# Patient Record
Sex: MALE | Race: WHITE | URBAN - METROPOLITAN AREA
[De-identification: names, ages, dates, MRNs, and addresses within clinical notes are randomized per-mention and may not be internally consistent; named-entity substitution may affect disease eponyms.]

---

## 2022-12-28 RX ORDER — AMOXICILLIN 250 MG/5ML
1 SUSPENSION, RECONSTITUTED, ORAL (ML) ORAL
Qty: 0 | Refills: 0 | DISCHARGE
Start: 2022-12-28 | End: 2023-01-06

## 2022-12-29 ENCOUNTER — INPATIENT (INPATIENT)
Facility: HOSPITAL | Age: 72
LOS: 3 days | Discharge: HOME | DRG: 153 | End: 2023-01-02
Attending: HOSPITALIST | Admitting: HOSPITALIST
Payer: MEDICARE

## 2022-12-29 VITALS
OXYGEN SATURATION: 92 % | TEMPERATURE: 101 F | RESPIRATION RATE: 18 BRPM | DIASTOLIC BLOOD PRESSURE: 64 MMHG | HEART RATE: 81 BPM | SYSTOLIC BLOOD PRESSURE: 106 MMHG

## 2022-12-29 DIAGNOSIS — Z96.611 PRESENCE OF RIGHT ARTIFICIAL SHOULDER JOINT: Chronic | ICD-10-CM

## 2022-12-29 DIAGNOSIS — A41.9 SEPSIS, UNSPECIFIED ORGANISM: ICD-10-CM

## 2022-12-29 LAB
ADD ON TEST-SPECIMEN IN LAB: SIGNIFICANT CHANGE UP
ALBUMIN SERPL ELPH-MCNC: 3 G/DL — LOW (ref 3.3–5)
ALP SERPL-CCNC: 93 U/L — SIGNIFICANT CHANGE UP (ref 40–120)
ALT FLD-CCNC: 26 U/L — SIGNIFICANT CHANGE UP (ref 12–78)
ANION GAP SERPL CALC-SCNC: 7 MMOL/L — SIGNIFICANT CHANGE UP (ref 5–17)
APPEARANCE UR: CLEAR — SIGNIFICANT CHANGE UP
APTT BLD: 34 SEC — SIGNIFICANT CHANGE UP (ref 27.5–35.5)
AST SERPL-CCNC: 19 U/L — SIGNIFICANT CHANGE UP (ref 15–37)
BACTERIA # UR AUTO: ABNORMAL
BASOPHILS # BLD AUTO: 0.01 K/UL — SIGNIFICANT CHANGE UP (ref 0–0.2)
BASOPHILS NFR BLD AUTO: 0.1 % — SIGNIFICANT CHANGE UP (ref 0–2)
BILIRUB SERPL-MCNC: 0.6 MG/DL — SIGNIFICANT CHANGE UP (ref 0.2–1.2)
BILIRUB UR-MCNC: NEGATIVE — SIGNIFICANT CHANGE UP
BUN SERPL-MCNC: 17 MG/DL — SIGNIFICANT CHANGE UP (ref 7–23)
CALCIUM SERPL-MCNC: 8.9 MG/DL — SIGNIFICANT CHANGE UP (ref 8.5–10.1)
CHLORIDE SERPL-SCNC: 108 MMOL/L — SIGNIFICANT CHANGE UP (ref 96–108)
CO2 SERPL-SCNC: 27 MMOL/L — SIGNIFICANT CHANGE UP (ref 22–31)
COLOR SPEC: YELLOW — SIGNIFICANT CHANGE UP
COMMENT - URINE: SIGNIFICANT CHANGE UP
CREAT SERPL-MCNC: 1.15 MG/DL — SIGNIFICANT CHANGE UP (ref 0.5–1.3)
DIFF PNL FLD: ABNORMAL
EGFR: 67 ML/MIN/1.73M2 — SIGNIFICANT CHANGE UP
EOSINOPHIL # BLD AUTO: 0.05 K/UL — SIGNIFICANT CHANGE UP (ref 0–0.5)
EOSINOPHIL NFR BLD AUTO: 0.5 % — SIGNIFICANT CHANGE UP (ref 0–6)
EPI CELLS # UR: SIGNIFICANT CHANGE UP
FLUAV AG NPH QL: SIGNIFICANT CHANGE UP
FLUBV AG NPH QL: SIGNIFICANT CHANGE UP
GLUCOSE BLDC GLUCOMTR-MCNC: 100 MG/DL — HIGH (ref 70–99)
GLUCOSE BLDC GLUCOMTR-MCNC: 107 MG/DL — HIGH (ref 70–99)
GLUCOSE SERPL-MCNC: 209 MG/DL — HIGH (ref 70–99)
GLUCOSE UR QL: NEGATIVE — SIGNIFICANT CHANGE UP
HCT VFR BLD CALC: 41.8 % — SIGNIFICANT CHANGE UP (ref 39–50)
HGB BLD-MCNC: 13.6 G/DL — SIGNIFICANT CHANGE UP (ref 13–17)
HYALINE CASTS # UR AUTO: ABNORMAL /LPF
IMM GRANULOCYTES NFR BLD AUTO: 0.5 % — SIGNIFICANT CHANGE UP (ref 0–0.9)
INR BLD: 1.97 RATIO — HIGH (ref 0.88–1.16)
KETONES UR-MCNC: ABNORMAL
LACTATE SERPL-SCNC: 1.3 MMOL/L — SIGNIFICANT CHANGE UP (ref 0.7–2)
LACTATE SERPL-SCNC: 2.9 MMOL/L — HIGH (ref 0.7–2)
LEUKOCYTE ESTERASE UR-ACNC: NEGATIVE — SIGNIFICANT CHANGE UP
LYMPHOCYTES # BLD AUTO: 0.33 K/UL — LOW (ref 1–3.3)
LYMPHOCYTES # BLD AUTO: 3.1 % — LOW (ref 13–44)
MCHC RBC-ENTMCNC: 31.9 PG — SIGNIFICANT CHANGE UP (ref 27–34)
MCHC RBC-ENTMCNC: 32.5 GM/DL — SIGNIFICANT CHANGE UP (ref 32–36)
MCV RBC AUTO: 98.1 FL — SIGNIFICANT CHANGE UP (ref 80–100)
MONOCYTES # BLD AUTO: 0.4 K/UL — SIGNIFICANT CHANGE UP (ref 0–0.9)
MONOCYTES NFR BLD AUTO: 3.7 % — SIGNIFICANT CHANGE UP (ref 2–14)
NEUTROPHILS # BLD AUTO: 9.95 K/UL — HIGH (ref 1.8–7.4)
NEUTROPHILS NFR BLD AUTO: 92.1 % — HIGH (ref 43–77)
NITRITE UR-MCNC: NEGATIVE — SIGNIFICANT CHANGE UP
PH UR: 5 — SIGNIFICANT CHANGE UP (ref 5–8)
PLATELET # BLD AUTO: 311 K/UL — SIGNIFICANT CHANGE UP (ref 150–400)
POTASSIUM SERPL-MCNC: 4.1 MMOL/L — SIGNIFICANT CHANGE UP (ref 3.5–5.3)
POTASSIUM SERPL-SCNC: 4.1 MMOL/L — SIGNIFICANT CHANGE UP (ref 3.5–5.3)
PROT SERPL-MCNC: 7.1 GM/DL — SIGNIFICANT CHANGE UP (ref 6–8.3)
PROT UR-MCNC: 30 MG/DL
PROTHROM AB SERPL-ACNC: 23 SEC — HIGH (ref 10.5–13.4)
RAPID RVP RESULT: DETECTED
RBC # BLD: 4.26 M/UL — SIGNIFICANT CHANGE UP (ref 4.2–5.8)
RBC # FLD: 14.8 % — HIGH (ref 10.3–14.5)
RBC CASTS # UR COMP ASSIST: SIGNIFICANT CHANGE UP /HPF (ref 0–4)
RSV RNA NPH QL NAA+NON-PROBE: SIGNIFICANT CHANGE UP
RV+EV RNA SPEC QL NAA+PROBE: DETECTED
SARS-COV-2 RNA SPEC QL NAA+PROBE: SIGNIFICANT CHANGE UP
SARS-COV-2 RNA SPEC QL NAA+PROBE: SIGNIFICANT CHANGE UP
SODIUM SERPL-SCNC: 142 MMOL/L — SIGNIFICANT CHANGE UP (ref 135–145)
SP GR SPEC: 1.02 — SIGNIFICANT CHANGE UP (ref 1.01–1.02)
TROPONIN I, HIGH SENSITIVITY RESULT: 6.61 NG/L — SIGNIFICANT CHANGE UP
UROBILINOGEN FLD QL: NEGATIVE — SIGNIFICANT CHANGE UP
WBC # BLD: 10.79 K/UL — HIGH (ref 3.8–10.5)
WBC # FLD AUTO: 10.79 K/UL — HIGH (ref 3.8–10.5)
WBC UR QL: SIGNIFICANT CHANGE UP /HPF (ref 0–5)

## 2022-12-29 PROCEDURE — 85014 HEMATOCRIT: CPT

## 2022-12-29 PROCEDURE — 86803 HEPATITIS C AB TEST: CPT

## 2022-12-29 PROCEDURE — 99285 EMERGENCY DEPT VISIT HI MDM: CPT

## 2022-12-29 PROCEDURE — 80048 BASIC METABOLIC PNL TOTAL CA: CPT

## 2022-12-29 PROCEDURE — 84100 ASSAY OF PHOSPHORUS: CPT

## 2022-12-29 PROCEDURE — 99222 1ST HOSP IP/OBS MODERATE 55: CPT

## 2022-12-29 PROCEDURE — 82607 VITAMIN B-12: CPT

## 2022-12-29 PROCEDURE — 82746 ASSAY OF FOLIC ACID SERUM: CPT

## 2022-12-29 PROCEDURE — 85018 HEMOGLOBIN: CPT

## 2022-12-29 PROCEDURE — 94640 AIRWAY INHALATION TREATMENT: CPT

## 2022-12-29 PROCEDURE — 71045 X-RAY EXAM CHEST 1 VIEW: CPT | Mod: 26

## 2022-12-29 PROCEDURE — 83735 ASSAY OF MAGNESIUM: CPT

## 2022-12-29 PROCEDURE — 36415 COLL VENOUS BLD VENIPUNCTURE: CPT

## 2022-12-29 PROCEDURE — 85025 COMPLETE CBC W/AUTO DIFF WBC: CPT

## 2022-12-29 PROCEDURE — 82962 GLUCOSE BLOOD TEST: CPT

## 2022-12-29 PROCEDURE — 83550 IRON BINDING TEST: CPT

## 2022-12-29 PROCEDURE — 83605 ASSAY OF LACTIC ACID: CPT

## 2022-12-29 PROCEDURE — 93970 EXTREMITY STUDY: CPT

## 2022-12-29 PROCEDURE — 83540 ASSAY OF IRON: CPT

## 2022-12-29 PROCEDURE — 85610 PROTHROMBIN TIME: CPT

## 2022-12-29 PROCEDURE — 85730 THROMBOPLASTIN TIME PARTIAL: CPT

## 2022-12-29 PROCEDURE — 83036 HEMOGLOBIN GLYCOSYLATED A1C: CPT

## 2022-12-29 PROCEDURE — 93010 ELECTROCARDIOGRAM REPORT: CPT

## 2022-12-29 PROCEDURE — 70486 CT MAXILLOFACIAL W/O DYE: CPT

## 2022-12-29 RX ORDER — PREGABALIN 225 MG/1
1000 CAPSULE ORAL
Qty: 0 | Refills: 0 | DISCHARGE

## 2022-12-29 RX ORDER — LANOLIN ALCOHOL/MO/W.PET/CERES
2 CREAM (GRAM) TOPICAL
Qty: 0 | Refills: 0 | DISCHARGE

## 2022-12-29 RX ORDER — LEVOTHYROXINE SODIUM 125 MCG
25 TABLET ORAL DAILY
Refills: 0 | Status: DISCONTINUED | OUTPATIENT
Start: 2022-12-29 | End: 2023-01-02

## 2022-12-29 RX ORDER — ATORVASTATIN CALCIUM 80 MG/1
1 TABLET, FILM COATED ORAL
Qty: 0 | Refills: 0 | DISCHARGE

## 2022-12-29 RX ORDER — SODIUM CHLORIDE 9 MG/ML
1000 INJECTION, SOLUTION INTRAVENOUS
Refills: 0 | Status: DISCONTINUED | OUTPATIENT
Start: 2022-12-29 | End: 2023-01-01

## 2022-12-29 RX ORDER — OXYCODONE HYDROCHLORIDE 5 MG/1
1 TABLET ORAL
Qty: 0 | Refills: 0 | DISCHARGE

## 2022-12-29 RX ORDER — TEMAZEPAM 15 MG/1
30 CAPSULE ORAL AT BEDTIME
Refills: 0 | Status: DISCONTINUED | OUTPATIENT
Start: 2022-12-29 | End: 2023-01-02

## 2022-12-29 RX ORDER — BENZOCAINE AND MENTHOL 5; 1 G/100ML; G/100ML
1 LIQUID ORAL ONCE
Refills: 0 | Status: DISCONTINUED | OUTPATIENT
Start: 2022-12-29 | End: 2023-01-02

## 2022-12-29 RX ORDER — CEFEPIME 1 G/1
INJECTION, POWDER, FOR SOLUTION INTRAMUSCULAR; INTRAVENOUS
Refills: 0 | Status: DISCONTINUED | OUTPATIENT
Start: 2022-12-29 | End: 2022-12-30

## 2022-12-29 RX ORDER — ATORVASTATIN CALCIUM 80 MG/1
20 TABLET, FILM COATED ORAL AT BEDTIME
Refills: 0 | Status: DISCONTINUED | OUTPATIENT
Start: 2022-12-29 | End: 2023-01-02

## 2022-12-29 RX ORDER — DEXTROSE 50 % IN WATER 50 %
25 SYRINGE (ML) INTRAVENOUS ONCE
Refills: 0 | Status: DISCONTINUED | OUTPATIENT
Start: 2022-12-29 | End: 2023-01-01

## 2022-12-29 RX ORDER — TEMAZEPAM 15 MG/1
2 CAPSULE ORAL
Qty: 0 | Refills: 0 | DISCHARGE

## 2022-12-29 RX ORDER — OXYCODONE HYDROCHLORIDE 5 MG/1
5 TABLET ORAL EVERY 8 HOURS
Refills: 0 | Status: DISCONTINUED | OUTPATIENT
Start: 2022-12-29 | End: 2023-01-02

## 2022-12-29 RX ORDER — ALBUTEROL 90 UG/1
2 AEROSOL, METERED ORAL
Qty: 0 | Refills: 0 | DISCHARGE

## 2022-12-29 RX ORDER — LEVOTHYROXINE SODIUM 125 MCG
1 TABLET ORAL
Qty: 0 | Refills: 0 | DISCHARGE

## 2022-12-29 RX ORDER — WARFARIN SODIUM 2.5 MG/1
3 TABLET ORAL ONCE
Refills: 0 | Status: COMPLETED | OUTPATIENT
Start: 2022-12-29 | End: 2022-12-29

## 2022-12-29 RX ORDER — SODIUM CHLORIDE 9 MG/ML
2000 INJECTION INTRAMUSCULAR; INTRAVENOUS; SUBCUTANEOUS ONCE
Refills: 0 | Status: COMPLETED | OUTPATIENT
Start: 2022-12-29 | End: 2022-12-29

## 2022-12-29 RX ORDER — ALBUTEROL 90 UG/1
2 AEROSOL, METERED ORAL EVERY 6 HOURS
Refills: 0 | Status: DISCONTINUED | OUTPATIENT
Start: 2022-12-29 | End: 2023-01-02

## 2022-12-29 RX ORDER — DEXTROSE 50 % IN WATER 50 %
15 SYRINGE (ML) INTRAVENOUS ONCE
Refills: 0 | Status: DISCONTINUED | OUTPATIENT
Start: 2022-12-29 | End: 2023-01-01

## 2022-12-29 RX ORDER — DULAGLUTIDE 4.5 MG/.5ML
1.5 INJECTION, SOLUTION SUBCUTANEOUS
Qty: 0 | Refills: 0 | DISCHARGE

## 2022-12-29 RX ORDER — CEFEPIME 1 G/1
1000 INJECTION, POWDER, FOR SOLUTION INTRAMUSCULAR; INTRAVENOUS EVERY 8 HOURS
Refills: 0 | Status: DISCONTINUED | OUTPATIENT
Start: 2022-12-30 | End: 2022-12-30

## 2022-12-29 RX ORDER — WARFARIN SODIUM 2.5 MG/1
1 TABLET ORAL
Qty: 0 | Refills: 0 | DISCHARGE

## 2022-12-29 RX ORDER — ESCITALOPRAM OXALATE 10 MG/1
10 TABLET, FILM COATED ORAL DAILY
Refills: 0 | Status: DISCONTINUED | OUTPATIENT
Start: 2022-12-29 | End: 2023-01-02

## 2022-12-29 RX ORDER — GLUCAGON INJECTION, SOLUTION 0.5 MG/.1ML
1 INJECTION, SOLUTION SUBCUTANEOUS ONCE
Refills: 0 | Status: DISCONTINUED | OUTPATIENT
Start: 2022-12-29 | End: 2023-01-01

## 2022-12-29 RX ORDER — PANTOPRAZOLE SODIUM 20 MG/1
40 TABLET, DELAYED RELEASE ORAL
Refills: 0 | Status: DISCONTINUED | OUTPATIENT
Start: 2022-12-29 | End: 2023-01-02

## 2022-12-29 RX ORDER — INSULIN LISPRO 100/ML
VIAL (ML) SUBCUTANEOUS
Refills: 0 | Status: DISCONTINUED | OUTPATIENT
Start: 2022-12-29 | End: 2023-01-01

## 2022-12-29 RX ORDER — SODIUM CHLORIDE 9 MG/ML
1000 INJECTION INTRAMUSCULAR; INTRAVENOUS; SUBCUTANEOUS ONCE
Refills: 0 | Status: COMPLETED | OUTPATIENT
Start: 2022-12-29 | End: 2022-12-29

## 2022-12-29 RX ORDER — ACETAMINOPHEN 500 MG
650 TABLET ORAL ONCE
Refills: 0 | Status: COMPLETED | OUTPATIENT
Start: 2022-12-29 | End: 2022-12-29

## 2022-12-29 RX ORDER — ACETAMINOPHEN 500 MG
2 TABLET ORAL
Qty: 0 | Refills: 0 | DISCHARGE

## 2022-12-29 RX ORDER — VANCOMYCIN HCL 1 G
1250 VIAL (EA) INTRAVENOUS EVERY 12 HOURS
Refills: 0 | Status: DISCONTINUED | OUTPATIENT
Start: 2022-12-29 | End: 2022-12-30

## 2022-12-29 RX ORDER — ESCITALOPRAM OXALATE 10 MG/1
1 TABLET, FILM COATED ORAL
Qty: 0 | Refills: 0 | DISCHARGE

## 2022-12-29 RX ORDER — SODIUM CHLORIDE 9 MG/ML
1000 INJECTION INTRAMUSCULAR; INTRAVENOUS; SUBCUTANEOUS
Refills: 0 | Status: DISCONTINUED | OUTPATIENT
Start: 2022-12-29 | End: 2022-12-31

## 2022-12-29 RX ORDER — BUDESONIDE, GLYCOPYRROLATE, AND FORMOTEROL FUMARATE 160; 9; 4.8 UG/1; UG/1; UG/1
1 AEROSOL, METERED RESPIRATORY (INHALATION)
Qty: 0 | Refills: 0 | DISCHARGE

## 2022-12-29 RX ORDER — MOMETASONE FUROATE 220 UG/1
2 INHALANT RESPIRATORY (INHALATION) DAILY
Refills: 0 | Status: DISCONTINUED | OUTPATIENT
Start: 2022-12-29 | End: 2023-01-02

## 2022-12-29 RX ORDER — MONTELUKAST 4 MG/1
1 TABLET, CHEWABLE ORAL
Qty: 0 | Refills: 0 | DISCHARGE

## 2022-12-29 RX ORDER — CEFEPIME 1 G/1
1000 INJECTION, POWDER, FOR SOLUTION INTRAMUSCULAR; INTRAVENOUS ONCE
Refills: 0 | Status: DISCONTINUED | OUTPATIENT
Start: 2022-12-29 | End: 2022-12-30

## 2022-12-29 RX ORDER — ACETAMINOPHEN 500 MG
1300 TABLET ORAL
Refills: 0 | Status: DISCONTINUED | OUTPATIENT
Start: 2022-12-29 | End: 2023-01-02

## 2022-12-29 RX ORDER — LANOLIN ALCOHOL/MO/W.PET/CERES
6 CREAM (GRAM) TOPICAL AT BEDTIME
Refills: 0 | Status: DISCONTINUED | OUTPATIENT
Start: 2022-12-29 | End: 2023-01-02

## 2022-12-29 RX ORDER — MONTELUKAST 4 MG/1
10 TABLET, CHEWABLE ORAL AT BEDTIME
Refills: 0 | Status: DISCONTINUED | OUTPATIENT
Start: 2022-12-29 | End: 2023-01-02

## 2022-12-29 RX ORDER — LOSARTAN POTASSIUM 100 MG/1
0.5 TABLET, FILM COATED ORAL
Qty: 0 | Refills: 0 | DISCHARGE

## 2022-12-29 RX ORDER — LIDOCAINE AND PRILOCAINE CREAM 25; 25 MG/G; MG/G
1 CREAM TOPICAL
Qty: 0 | Refills: 0 | DISCHARGE

## 2022-12-29 RX ADMIN — ATORVASTATIN CALCIUM 20 MILLIGRAM(S): 80 TABLET, FILM COATED ORAL at 21:15

## 2022-12-29 RX ADMIN — SODIUM CHLORIDE 2000 MILLILITER(S): 9 INJECTION INTRAMUSCULAR; INTRAVENOUS; SUBCUTANEOUS at 10:41

## 2022-12-29 RX ADMIN — MONTELUKAST 10 MILLIGRAM(S): 4 TABLET, CHEWABLE ORAL at 21:15

## 2022-12-29 RX ADMIN — SODIUM CHLORIDE 75 MILLILITER(S): 9 INJECTION INTRAMUSCULAR; INTRAVENOUS; SUBCUTANEOUS at 18:29

## 2022-12-29 RX ADMIN — Medication 650 MILLIGRAM(S): at 10:42

## 2022-12-29 RX ADMIN — Medication 6 MILLIGRAM(S): at 21:15

## 2022-12-29 RX ADMIN — ESCITALOPRAM OXALATE 10 MILLIGRAM(S): 10 TABLET, FILM COATED ORAL at 21:15

## 2022-12-29 RX ADMIN — Medication 1300 MILLIGRAM(S): at 21:16

## 2022-12-29 NOTE — ED ADULT NURSE NOTE - NSICDXPASTMEDICALHX_GEN_ALL_CORE_FT
PAST MEDICAL HISTORY:  Diabetes Type 2, Controlled     History of Prostate Cancer 5/2010    History of Varicose Vein     HTN - Hypertension     Hyperlipidemia     Obese     Osteoarthritis     Urinary Incontinence, Male, Stress 5/10      GVHD (graft versus host disease)

## 2022-12-29 NOTE — PHYSICAL THERAPY INITIAL EVALUATION ADULT - DIAGNOSIS, PT EVAL
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

## 2022-12-29 NOTE — PHYSICAL THERAPY INITIAL EVALUATION ADULT - TRANSFER TRAINING, PT EVAL
BY DC : Transfers IN & OOB/chair/toilet with least restrictive assistive device and supervision only

## 2022-12-29 NOTE — H&P ADULT - NSHPLABSRESULTS_GEN_ALL_CORE
13.6   10.79 )-----------( 311      ( 29 Dec 2022 10:12 )             41.8         142  |  108  |  17  ----------------------------<  209<H>  4.1   |  27  |  1.15    Ca    8.9      29 Dec 2022 10:12    TPro  7.1  /  Alb  3.0<L>  /  TBili  0.6  /  DBili  x   /  AST  19  /  ALT  26  /  AlkPhos  93          LIVER FUNCTIONS - ( 29 Dec 2022 10:12 )  Alb: 3.0 g/dL / Pro: 7.1 gm/dL / ALK PHOS: 93 U/L / ALT: 26 U/L / AST: 19 U/L / GGT: x           PT/INR - ( 29 Dec 2022 10:12 )   PT: 23.0 sec;   INR: 1.97 ratio         PTT - ( 29 Dec 2022 10:12 )  PTT:34.0 sec  Urinalysis Basic - ( 29 Dec 2022 10:48 )    Color: Yellow / Appearance: Clear / S.025 / pH: x  Gluc: x / Ketone: Trace  / Bili: Negative / Urobili: Negative   Blood: x / Protein: 30 mg/dL / Nitrite: Negative   Leuk Esterase: Negative / RBC: 0-2 /HPF / WBC 0-2 /HPF   Sq Epi: x / Non Sq Epi: Occasional / Bacteria: Occasional        Lactate, Blood: 1.3 mmol/L ( @ 13:56)  Lactate, Blood: 2.9 mmol/L ( @ 10:12)    Blood, Urine: Trace ( @ 10:48)

## 2022-12-29 NOTE — PHYSICAL THERAPY INITIAL EVALUATION ADULT - PLANNED THERAPY INTERVENTIONS, PT EVAL
home safety/fall prevention education/bed mobility training/gait training/strengthening/transfer training

## 2022-12-29 NOTE — H&P ADULT - HISTORY OF PRESENT ILLNESS
73 y/o male with PMHx of Prostate CA s/p prostatectomy 5/10, DM2, HLD, HTN, GVHD, obesity, osteoarthritis presents to the ED c/o weakness, fevers, right ear infection and sinus infection. Per pt's wife, they recently flew up from [place] on Thursday and  has been complaining of headache and green mucous from his nose. Pt was seen at  and put on Amoxicillin. Pt has a history of Leukemia s/p stem cell transplant per wife, pt now has rmums-pfcqme-akad disease. Per wife pt was hypotensive to 102 systolic at [facility] today. Wife states that he normally uses O2 at night because of sleep apnea. Pt denies any abd pain, but states he has a large abdominal wall hernia. Pt states he has an artificial urinary sphincter that can't be catheterized. Pt states he had a fall about a week ago where he strained some muscles in his right arm. Per wife pt is unable to ambulate on his own  In the ED WBC found to be elevated, Lactate 3.0, RVP + Rhino/Entero Virus, received sepsis fluids. 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 Per pt's wife, they recently flew up from [place] on Thursday and  has been complaining of headache and green mucous from his nose. Pt was seen at  and put on Amoxicillin. Pt has a history of Leukemia (AML) s/p stem cell transplant per wife, with subsequent xalvc-tvcfvo-hynu disease. Wife states that he normally uses O2 at night because of sleep apnea, unable to tolerate CPAP mask. Pt denies any abdominal pain, but states he has a large abdominal wall hernia. Pt states he has an artificial urinary sphincter for incontinence after prostatectomy and can't be catheterized. Pt states he had a fall about a week ago where he strained some muscles in his right arm, s/p bilateral shoulder replacement surgeries. Per wife pt is unable to ambulate on his own  In the ED WBC found to be elevated, Lactate 3.0, RVP + Rhino/Entero Virus, received sepsis fluids.  .

## 2022-12-29 NOTE — ED PROVIDER NOTE - CLINICAL SUMMARY MEDICAL DECISION MAKING FREE TEXT BOX
73 y/o male with PMHx of Prostate CA s/p prostatectomy 5/10, DM2, HLD, HTN, obesity, osteoarthritis, leukemia s/p stem cell transplant presents to the ED with generalized weakness, fever, URI symptoms. Will work up for sepsis, labs and reassess. 71 y/o male with PMHx of Prostate CA s/p prostatectomy 5/10, DM2, HLD, HTN, obesity, osteoarthritis, leukemia s/p stem cell transplant, pplsd-wbhywq-tchl-disease presents to the ED with generalized weakness, fever, URI symptoms. Will work up for sepsis, labs and reassess.

## 2022-12-29 NOTE — PHYSICAL THERAPY INITIAL EVALUATION ADULT - GENERAL OBSERVATIONS, REHAB EVAL
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

## 2022-12-29 NOTE — PHYSICAL THERAPY INITIAL EVALUATION ADULT - ADDITIONAL COMMENTS
pt reports he is normally independent ambulator and goes to gym twice weekly to work out with a  ; pt has h/o multiple arthroplasties including L hip replacement x2 (had infection and needed ABx spacer x 6-8 weeks before revision surgery) R hip replacement, L reverse TSA , R TSA; pt admits he trips over his own feet a lot due to pidgeon-toed gait (he claims worse after hip replacement surgeries)

## 2022-12-29 NOTE — H&P ADULT - NSICDXPASTMEDICALHX_GEN_ALL_CORE_FT
PAST MEDICAL HISTORY:  Diabetes Type 2, Controlled     GVHD (graft versus host disease)     History of Prostate Cancer 5/2010    History of Varicose Vein     HTN - Hypertension     Hyperlipidemia     Obese     Osteoarthritis     Urinary Incontinence, Male, Stress 5/10     PAST MEDICAL HISTORY:  AML (acute myelogenous leukemia)     Diabetes Type 2, Controlled     GVHD (graft versus host disease)     History of Prostate Cancer 5/2010    History of Varicose Vein     HTN - Hypertension     Hyperlipidemia     Obese     Osteoarthritis     Post traumatic stress disorder (PTSD)     Urinary Incontinence, Male, Stress 5/10    Venous thromboembolism (VTE) not present on admission

## 2022-12-29 NOTE — PHYSICAL THERAPY INITIAL EVALUATION ADULT - PRECAUTIONS/LIMITATIONS, REHAB EVAL
recent fall straining muscles in RUE/fall precautions recent fall straining muscles in RUE; h/o prostate CA s/p prostatectomy May 2010, leukemia s/p stem-cell transplant but now with graft vs host disease/fall precautions recent fall straining muscles in RUE; h/o prostate CA s/p prostatectomy May 2010, leukemia s/p stem-cell transplant but now with graft vs host disease, uses O2 at night for ?sleep apnea/fall precautions recent fall straining posterior upper arm muscles in RUE; h/o prostate CA s/p prostatectomy May 2010, leukemia s/p stem-cell transplant but now with graft vs host disease, uses O2 at night for ?sleep apnea/fall precautions/bilateral hip precautions

## 2022-12-29 NOTE — PHYSICAL THERAPY INITIAL EVALUATION ADULT - PERTINENT HX OF CURRENT PROBLEM, REHAB EVAL
Flew up from [facility] on Thursday 12/22 and c/o pain R ear, green mucus from nose ,fevers, seen at Urgent Care and started on Amoxicillin ; Pt with h/o leukemia s/p stem-cell transplant but now with Graft vs Host Disease

## 2022-12-29 NOTE — ED PROVIDER NOTE - NS ED ROS FT
Constitutional: +fever, no chills, +weakness  Eyes: No visual changes  HEENT: No throat pain, +ear infection, +nasal congestion  CV: No chest pain  Resp: No SOB no cough  GI: No abd pain, nausea or vomiting  : No dysuria  MSK: No musculoskeletal pain, +weakness  Skin: No rash  Neuro: +headache

## 2022-12-29 NOTE — PHYSICAL THERAPY INITIAL EVALUATION ADULT - ACTIVE RANGE OF MOTION EXAMINATION, REHAB EVAL
no difficulty elevating RUE/bilateral upper extremity Active ROM was WFL (within functional limits)/bilateral  lower extremity Active ROM was WFL (within functional limits)

## 2022-12-29 NOTE — PATIENT PROFILE ADULT - STATED REASON FOR ADMISSION
presents to ED with generalized weakness. was diagnosed with right ear infection yesterday on Amoxicillin.

## 2022-12-29 NOTE — ED PROVIDER NOTE - OBJECTIVE STATEMENT
73 y/o male with PMHx of Prostate CA s/p prostatectomy 5/10, DM2, HLD, HTN, obesity, osteoarthritis presents to the ED c/o weakness, fevers, right ear infection and sinus infection. Per pt's wife, they recently flew up from [place] on Thursday and  has been complaining of headache and green mucous from his nose. Pt was seen at  and put on Amoxicillin. Pt has a history of Leukemia s/p stem cell transplant per wife. Wife denies any fevers. Per wife pt was hypotensive to 102 systolic at [facility] today. Wife states that he uses O2 at night because of sleep apnea. Pt denies any abd pain, but states he has a large hernia. Pt states he has an artificial urinary sphincter that can't be catheterized. Pt states he had a fall about a week ago where he strained some muscles in his right arm. Per wife pt is unable to ambulate on his own. 73 y/o male with PMHx of Prostate CA s/p prostatectomy 5/10, DM2, HLD, HTN, obesity, osteoarthritis presents to the ED c/o weakness, fevers, right ear infection and sinus infection. Per pt's wife, they recently flew up from [place] on Thursday and  has been complaining of headache and green mucous from his nose. Pt was seen at  and put on Amoxicillin. Pt has a history of Leukemia s/p stem cell transplant per wife, pt now has qpexy-pzuczb-nmtp disease. Wife denies any fevers. Per wife pt was hypotensive to 102 systolic at [facility] today. Wife states that he uses O2 at night because of sleep apnea. Pt denies any abd pain, but states he has a large hernia. Pt states he has an artificial urinary sphincter that can't be catheterized. Pt states he had a fall about a week ago where he strained some muscles in his right arm. Per wife pt is unable to ambulate on his own. 73 y/o male with PMHx of Prostate CA s/p prostatectomy 5/10, DM2, HLD, HTN, GVHD obesity, osteoarthritis presents to the ED c/o weakness, fevers, right ear infection and sinus infection. Per pt's wife, they recently flew up from [place] on Thursday and  has been complaining of headache and green mucous from his nose. Pt was seen at  and put on Amoxicillin. Pt has a history of Leukemia s/p stem cell transplant per wife, pt now has goptn-fziblf-qeas disease. Wife denies any fevers. Per wife pt was hypotensive to 102 systolic at [facility] today. Wife states that he uses O2 at night because of sleep apnea. Pt denies any abd pain, but states he has a large hernia. Pt states he has an artificial urinary sphincter that can't be catheterized. Pt states he had a fall about a week ago where he strained some muscles in his right arm. Per wife pt is unable to ambulate on his own.

## 2022-12-29 NOTE — ED PROVIDER NOTE - NS_ ATTENDINGSCRIBEDETAILS _ED_A_ED_FT
I, Jef Hanna MD,  performed the initial face to face bedside interview with this patient regarding history of present illness, review of symptoms and relevant past medical, social and family history.  I completed an independent physical examination.  I was the initial provider who evaluated this patient.   I personally saw the patient and performed a substantive portion of the visit including all aspects of the medical decision making.  The history, relevant review of systems, past medical and surgical history, medical decision making, and physical examination was documented by the scribe in my presence and I attest to the accuracy of the documentation.

## 2022-12-29 NOTE — ED ADULT NURSE NOTE - OBJECTIVE STATEMENT
Pt comes to Ed c/o generalized weakness for 2 days. Pt given amoxicilin at  for ear infection and sinus infection. Pt mild fever in ED. Pt denies any N/V/D but increase in +lethargy. Pt has KNDA and is AOX4 in the ED. Pt is normally ambulatory but too weak in ED to ambulate.

## 2022-12-29 NOTE — H&P ADULT - ASSESSMENT
73 y/o male with PMHx of Prostate CA s/p prostatectomy 5/10, DM2, HLD, HTN, GVHD, obesity, osteoarthritis, COPD presents to the ED c/o weakness, fevers, right ear infection and sinus infection. Per pt's wife, they recently flew up from [place] on Thursday and  has been complaining of headache and green mucous from his nose. Pt was seen at  and put on Amoxicillin. Pt has a history of Leukemia s/p stem cell transplant per wife, pt now has rmoom-rmcjmx-furz disease. Per wife pt was hypotensive to 102 systolic at [facility] today. Wife states that he normally uses O2 at night because of sleep apnea. Pt denies any abd pain, but states he has a large abdominal wall hernia. Pt states he has an artificial urinary sphincter that can't be catheterized. Pt states he had a fall about a week ago where he strained some muscles in his right arm. Per wife pt is unable to ambulate on his own  In the ED WBC found to be elevated, Lactate 3.0, RVP + Rhino/Entero Virus, received sepsis fluids.    Admit to med surg for viral illness (Rhino/Entero) with secondary sepsis.   Continue IV fluids, follow repeat lactate  O2 NC 2LPM  Symptomatic supportive care. 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 Per pt's wife, they recently flew up from [place] on Thursday and  has been complaining of headache and green mucous from his nose. Pt was seen at  and put on Amoxicillin. Pt has a history of Leukemia s/p stem cell transplant per wife, pt now has olvmy-jnzmmu-feec disease. Per wife pt was hypotensive to 102 systolic at [facility] today. Wife states that he normally uses O2 at night because of sleep apnea. Pt denies any abd pain, but states he has a large abdominal wall hernia. Pt states he has an artificial urinary sphincter that can't be catheterized. Pt states he had a fall about a week ago where he strained some muscles in his right arm. Per wife pt is unable to ambulate on his own  In the ED WBC found to be elevated, Lactate 3.0, RVP + Rhino/Entero Virus, received sepsis fluids.    Admit to med surg for viral illness (Rhino/Entero) with secondary sepsis.   Continue IV fluids, follow repeat lactate, 2.9 > 1.3  O2 NC 2LPM  Follow blood cultures, trend fever and WBC  Symptomatic supportive care. Bronchodilators PRN, Tylenol PRN, continue inhaled steroids and montelukast for COPD  PT consult  Hold home DM meds, HISS, heck A1C, consistent carb diet  Fall risk, safety precautions  Start IV Cefepime (Hx of rash to meropenem in the past, no anaphylaxis), consult ID    While examining patient on the floor his BP was noted to be 98/47. NS 1L bolus ordered, repeat BP. Starting antibiotics and consulting ID   71 y/o male with PMHx of Prostate CA s/p prostatectomy 5/10, DVT/PE on daily [facility]farin [facility], DM2, HLD, HTN, GVHD, obesity, osteoarthritis, COPD/IPF, PTSD/insomnia, AML presents to the ED c/o weakness, fevers, right ear infection and sinus infection x 3 days. Per pt's wife, they recently flew up from [place]  and  has been complaining of headache and green mucous from his nose. Pt was seen at  and put on Amoxicillin. Pt has a history of Leukemia (AML) s/p stem cell transplant per wife, pt now has vvjgi-enxyrh-dxld disease. Wife states that he normally uses O2 at night because of sleep apnea unable to tolerate CPAP mask. . Pt denies any abd pain, but states he has a large abdominal wall hernia. Pt states he has an artificial urinary sphincter for incontinence after prostatectomy that can't be catheterized. Pt states he had a fall about a week ago where he strained some muscles in his right arm. Per wife pt is unable to ambulate on his own  In the ED WBC found to be elevated, Lactate 3.0, RVP + Rhino/Entero Virus, received sepsis fluids.    Admit to med surg for viral illness (Rhino/Entero) with secondary sepsis.   Continue IV fluids, follow repeat lactate, 2.9 > 1.3  O2 NC 2LPM  Follow blood cultures, trend fever and WBC  Symptomatic supportive care. Bronchodilators PRN, Tylenol PRN, continue inhaled steroids and montelukast for COPD  PT consult  Hold home DM meds, HISS, heck A1C, consistent carb diet  Patient missed 1 dose warfarin 3mg, resume today, check INR tomorrow  Fall risk, safety precautions    While examining patient on the floor his BP was noted to be 98/47. NS 1L bolus ordered, repeat BP. 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 Per pt's wife, they recently flew up from [place]  and  has been complaining of headache and green mucous from his nose. Pt was seen at  and put on Amoxicillin. Pt has a history of Leukemia (AML) s/p stem cell transplant per wife, pt now has jcrkc-fmzvtk-jqnn disease. Wife states that he normally uses O2 at night because of sleep apnea unable to tolerate CPAP mask. . Pt denies any abd pain, but states he has a large abdominal wall hernia. Pt states he has an artificial urinary sphincter for incontinence after prostatectomy that can't be catheterized. Pt states he had a fall about a week ago where he strained some muscles in his right arm. Per wife pt is unable to ambulate on his own  In the ED WBC found to be elevated, Lactate 3.0, RVP + Rhino/Entero Virus, received sepsis fluids.    Admit to med surg for viral illness (Rhino/Entero) with secondary sepsis.   Continue IV fluids, follow repeat lactate, 2.9 > 1.3  O2 NC 2LPM  Follow blood cultures, urine cultures, trend fever and WBC  Symptomatic supportive care. Bronchodilators PRN, Tylenol PRN, continue inhaled steroids and montelukast for COPD  PT consult  Hold home DM meds, HISS, heck A1C, consistent carb diet  Patient missed 1 dose warfarin 3mg, resume today, check INR tomorrow  Fall risk, safety precautions    While examining patient on the floor his BP was noted to be 98/47. NS 1L bolus ordered, repeat BP. Starting antibiotics and consulting ID  Start IV Cefepime/Vanco (Hx of rash to meropenem in the past, no anaphylaxis), consult ID

## 2022-12-29 NOTE — PHYSICAL THERAPY INITIAL EVALUATION ADULT - SKIN INTEGRITY
multiple surgical scars including B hips/shoulders, skin cancer resections scattered , recent L forehead MOHS surgery with healing incision/scabbed/scar/surgical incision

## 2022-12-29 NOTE — ED PROVIDER NOTE - NSICDXPASTMEDICALHX_GEN_ALL_CORE_FT
PAST MEDICAL HISTORY:  Diabetes Type 2, Controlled     History of Prostate Cancer 5/2010    History of Varicose Vein     HTN - Hypertension     Hyperlipidemia     Obese     Osteoarthritis     Urinary Incontinence, Male, Stress 5/10       PAST MEDICAL HISTORY:  Diabetes Type 2, Controlled     History of Prostate Cancer 5/2010    History of Varicose Vein     HTN - Hypertension     Hyperlipidemia     Obese     Osteoarthritis     Urinary Incontinence, Male, Stress 5/10      GVHD (graft versus host disease)

## 2022-12-29 NOTE — ED ADULT TRIAGE NOTE - CHIEF COMPLAINT QUOTE
pt presents to ED from home for generalized weakness, unable to get out of bed today. denies falls. diagnosed with R ear infection and started on amoxicillin yesterday . hx COPD.  A&O x4. febrile in ED.

## 2022-12-29 NOTE — H&P ADULT - NSHPSOCIALHISTORY_GEN_ALL_CORE
lives at home with his wife lives at home with his wife in [place], retired from [employer]

## 2022-12-29 NOTE — ED PROVIDER NOTE - PROGRESS NOTE DETAILS
discussed results with patient. pt still feeling extremely weak cannot walk - pt with sepsis likely viral. pt endorsed to Dr. Daigle accepts. Jef Hanna M.D., Attending Physician

## 2022-12-29 NOTE — ED PROVIDER NOTE - NSICDXPASTSURGICALHX_GEN_ALL_CORE_FT
PAST SURGICAL HISTORY:  History of Prostatectomy 5/10    Varicose Vein stripping Right LE 1987

## 2022-12-29 NOTE — PHYSICAL THERAPY INITIAL EVALUATION ADULT - NSACTIVITYREC_GEN_A_PT
out of bed to chair, bathroom with 1PA, least restrictive assistive device (RW to cane to No device as tolerated) PT for progressive strengthening,mobilization on unit with RW  or cane as needed

## 2022-12-29 NOTE — PHARMACOTHERAPY INTERVENTION NOTE - COMMENTS
Medication history complete, reviewed medication history with patients wife and confirmed with DrFirst.

## 2022-12-29 NOTE — H&P ADULT - NSICDXPASTSURGICALHX_GEN_ALL_CORE_FT
PAST SURGICAL HISTORY:  History of Prostatectomy 5/10    Varicose Vein stripping Right LE 1987     PAST SURGICAL HISTORY:  History of Prostatectomy 5/10    S/p bilateral shoulder joint replacement     Varicose Vein stripping Right LE 1987

## 2022-12-29 NOTE — ED PROVIDER NOTE - PHYSICAL EXAMINATION
Constitutional: NAD AAOx3  Eyes: PERRLA EOMI  Head: Normocephalic atraumatic  Mouth: MMM  Cardiac: regular rate   Resp: Lungs CTAB  GI: Abd s/nt/nd  Neuro: CN2-12 intact  Skin: No visible rashes Constitutional: NAD AAOx3  Eyes: PERRLA EOMI  Head: Normocephalic atraumatic  Mouth: MMM  Cardiac: regular rate   Resp: CTAB  GI: Abd s/nt/nd  Neuro: CN2-12 intact  Skin: No visible rashes

## 2022-12-29 NOTE — PHYSICAL THERAPY INITIAL EVALUATION ADULT - PATIENT PROFILE REVIEW, REHAB EVAL
per wife unable to ambulate unassisted , has urinary incontinence and artificial urinary sphincter/unable to be catheterized pt with h/o Acute Myeloid Leukemia s/p Bone Marrow Transplant ,now with GVHD ; per wife unable to ambulate unassisted  , has urinary incontinence and artificial urinary sphincter/unable to be catheterized

## 2022-12-30 LAB
A1C WITH ESTIMATED AVERAGE GLUCOSE RESULT: 6 % — HIGH (ref 4–5.6)
BASOPHILS # BLD AUTO: 0.01 K/UL — SIGNIFICANT CHANGE UP (ref 0–0.2)
BASOPHILS NFR BLD AUTO: 0.1 % — SIGNIFICANT CHANGE UP (ref 0–2)
CULTURE RESULTS: SIGNIFICANT CHANGE UP
EOSINOPHIL # BLD AUTO: 0.83 K/UL — HIGH (ref 0–0.5)
EOSINOPHIL NFR BLD AUTO: 9.3 % — HIGH (ref 0–6)
ESTIMATED AVERAGE GLUCOSE: 126 MG/DL — HIGH (ref 68–114)
GLUCOSE BLDC GLUCOMTR-MCNC: 103 MG/DL — HIGH (ref 70–99)
GLUCOSE BLDC GLUCOMTR-MCNC: 119 MG/DL — HIGH (ref 70–99)
GLUCOSE BLDC GLUCOMTR-MCNC: 120 MG/DL — HIGH (ref 70–99)
GLUCOSE BLDC GLUCOMTR-MCNC: 141 MG/DL — HIGH (ref 70–99)
HCT VFR BLD CALC: 36 % — LOW (ref 39–50)
HCV AB S/CO SERPL IA: 0.06 S/CO — SIGNIFICANT CHANGE UP (ref 0–0.99)
HCV AB SERPL-IMP: SIGNIFICANT CHANGE UP
HGB BLD-MCNC: 11.5 G/DL — LOW (ref 13–17)
IMM GRANULOCYTES NFR BLD AUTO: 0.3 % — SIGNIFICANT CHANGE UP (ref 0–0.9)
INR BLD: 1.7 RATIO — HIGH (ref 0.88–1.16)
LYMPHOCYTES # BLD AUTO: 0.64 K/UL — LOW (ref 1–3.3)
LYMPHOCYTES # BLD AUTO: 7.2 % — LOW (ref 13–44)
MCHC RBC-ENTMCNC: 31.9 GM/DL — LOW (ref 32–36)
MCHC RBC-ENTMCNC: 32.1 PG — SIGNIFICANT CHANGE UP (ref 27–34)
MCV RBC AUTO: 100.6 FL — HIGH (ref 80–100)
MONOCYTES # BLD AUTO: 0.25 K/UL — SIGNIFICANT CHANGE UP (ref 0–0.9)
MONOCYTES NFR BLD AUTO: 2.8 % — SIGNIFICANT CHANGE UP (ref 2–14)
NEUTROPHILS # BLD AUTO: 7.14 K/UL — SIGNIFICANT CHANGE UP (ref 1.8–7.4)
NEUTROPHILS NFR BLD AUTO: 80.3 % — HIGH (ref 43–77)
PLATELET # BLD AUTO: 257 K/UL — SIGNIFICANT CHANGE UP (ref 150–400)
PROTHROM AB SERPL-ACNC: 19.8 SEC — HIGH (ref 10.5–13.4)
RBC # BLD: 3.58 M/UL — LOW (ref 4.2–5.8)
RBC # FLD: 15 % — HIGH (ref 10.3–14.5)
SPECIMEN SOURCE: SIGNIFICANT CHANGE UP
WBC # BLD: 8.9 K/UL — SIGNIFICANT CHANGE UP (ref 3.8–10.5)
WBC # FLD AUTO: 8.9 K/UL — SIGNIFICANT CHANGE UP (ref 3.8–10.5)

## 2022-12-30 PROCEDURE — 99232 SBSQ HOSP IP/OBS MODERATE 35: CPT

## 2022-12-30 PROCEDURE — 70486 CT MAXILLOFACIAL W/O DYE: CPT | Mod: 26

## 2022-12-30 RX ORDER — AMPICILLIN SODIUM AND SULBACTAM SODIUM 250; 125 MG/ML; MG/ML
3 INJECTION, POWDER, FOR SUSPENSION INTRAMUSCULAR; INTRAVENOUS EVERY 6 HOURS
Refills: 0 | Status: DISCONTINUED | OUTPATIENT
Start: 2022-12-30 | End: 2023-01-02

## 2022-12-30 RX ORDER — CEFEPIME 1 G/1
1000 INJECTION, POWDER, FOR SOLUTION INTRAMUSCULAR; INTRAVENOUS ONCE
Refills: 0 | Status: COMPLETED | OUTPATIENT
Start: 2022-12-30 | End: 2022-12-30

## 2022-12-30 RX ORDER — CEFEPIME 1 G/1
INJECTION, POWDER, FOR SOLUTION INTRAMUSCULAR; INTRAVENOUS
Refills: 0 | Status: DISCONTINUED | OUTPATIENT
Start: 2022-12-30 | End: 2022-12-30

## 2022-12-30 RX ORDER — WARFARIN SODIUM 2.5 MG/1
3 TABLET ORAL AT BEDTIME
Refills: 0 | Status: COMPLETED | OUTPATIENT
Start: 2022-12-30 | End: 2022-12-30

## 2022-12-30 RX ORDER — CEFEPIME 1 G/1
1000 INJECTION, POWDER, FOR SOLUTION INTRAMUSCULAR; INTRAVENOUS EVERY 8 HOURS
Refills: 0 | Status: DISCONTINUED | OUTPATIENT
Start: 2022-12-30 | End: 2022-12-30

## 2022-12-30 RX ORDER — ACETAMINOPHEN 500 MG
650 TABLET ORAL EVERY 6 HOURS
Refills: 0 | Status: DISCONTINUED | OUTPATIENT
Start: 2022-12-30 | End: 2023-01-02

## 2022-12-30 RX ADMIN — ATORVASTATIN CALCIUM 20 MILLIGRAM(S): 80 TABLET, FILM COATED ORAL at 21:54

## 2022-12-30 RX ADMIN — CEFEPIME 1000 MILLIGRAM(S): 1 INJECTION, POWDER, FOR SOLUTION INTRAMUSCULAR; INTRAVENOUS at 06:09

## 2022-12-30 RX ADMIN — MONTELUKAST 10 MILLIGRAM(S): 4 TABLET, CHEWABLE ORAL at 21:55

## 2022-12-30 RX ADMIN — AMPICILLIN SODIUM AND SULBACTAM SODIUM 200 GRAM(S): 250; 125 INJECTION, POWDER, FOR SUSPENSION INTRAMUSCULAR; INTRAVENOUS at 17:05

## 2022-12-30 RX ADMIN — ESCITALOPRAM OXALATE 10 MILLIGRAM(S): 10 TABLET, FILM COATED ORAL at 09:42

## 2022-12-30 RX ADMIN — AMPICILLIN SODIUM AND SULBACTAM SODIUM 200 GRAM(S): 250; 125 INJECTION, POWDER, FOR SUSPENSION INTRAMUSCULAR; INTRAVENOUS at 23:34

## 2022-12-30 RX ADMIN — PANTOPRAZOLE SODIUM 40 MILLIGRAM(S): 20 TABLET, DELAYED RELEASE ORAL at 06:10

## 2022-12-30 RX ADMIN — Medication 6 MILLIGRAM(S): at 21:55

## 2022-12-30 RX ADMIN — Medication 166.67 MILLIGRAM(S): at 09:42

## 2022-12-30 RX ADMIN — SODIUM CHLORIDE 1000 MILLILITER(S): 9 INJECTION INTRAMUSCULAR; INTRAVENOUS; SUBCUTANEOUS at 00:08

## 2022-12-30 RX ADMIN — SODIUM CHLORIDE 75 MILLILITER(S): 9 INJECTION INTRAMUSCULAR; INTRAVENOUS; SUBCUTANEOUS at 09:44

## 2022-12-30 RX ADMIN — Medication 1300 MILLIGRAM(S): at 21:57

## 2022-12-30 RX ADMIN — Medication 650 MILLIGRAM(S): at 17:49

## 2022-12-30 RX ADMIN — MOMETASONE FUROATE 2 PUFF(S): 220 INHALANT RESPIRATORY (INHALATION) at 08:50

## 2022-12-30 RX ADMIN — Medication 25 MICROGRAM(S): at 06:10

## 2022-12-30 RX ADMIN — WARFARIN SODIUM 3 MILLIGRAM(S): 2.5 TABLET ORAL at 00:08

## 2022-12-30 RX ADMIN — CEFEPIME 1000 MILLIGRAM(S): 1 INJECTION, POWDER, FOR SOLUTION INTRAMUSCULAR; INTRAVENOUS at 00:59

## 2022-12-30 RX ADMIN — WARFARIN SODIUM 3 MILLIGRAM(S): 2.5 TABLET ORAL at 21:56

## 2022-12-30 RX ADMIN — AMPICILLIN SODIUM AND SULBACTAM SODIUM 200 GRAM(S): 250; 125 INJECTION, POWDER, FOR SUSPENSION INTRAMUSCULAR; INTRAVENOUS at 12:55

## 2022-12-30 NOTE — CONSULT NOTE ADULT - ASSESSMENT
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 Per pt's wife, they recently flew up from [place] on Thursday and  has been complaining of headache and green mucous from his nose. Pt was seen at  and put on Amoxicillin. Wife states that he normally uses O2 at night because of sleep apnea, unable to tolerate CPAP mask. Pt denies any abdominal pain, but states he has a large abdominal wall hernia. Pt states he had a fall about a week ago where he strained some muscles in his right arm. Per wife pt is unable to ambulate on his own  In the ED WBC found to be elevated lactate 3.0, RVP + Rhino/Entero Virus. He received cefepime and vancomycin IV.     1. Low grade fever. URI with rhinovirus. Possible sepsis.  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 Per pt's wife, they recently flew up from [place] on Thursday and  has been complaining of headache and green mucous from his nose. Pt was seen at  and put on Amoxicillin. Wife states that he normally uses O2 at night because of sleep apnea, unable to tolerate CPAP mask. Pt denies any abdominal pain, but states he has a large abdominal wall hernia. Pt states he had a fall about a week ago where he strained some muscles in his right arm. Per wife pt is unable to ambulate on his own  In the ED WBC found to be elevated lactate 3.0, RVP + Rhino/Entero Virus. He received cefepime and vancomycin IV.     1. Low grade fever. Acute right otitis media and acute sinusitis. URI with rhinovirus. Possible sepsis. Prostate Ca. Prior AML s/p BMT.  -obtain BC x 2  -obtain CT sinuses  -start unasyn 3 gm IV q8h  -reason for abx use and side effects reviewed with patient; monitor BMP  -f/u cultures  -old chart reviewed to assess prior cultures  -monitor temps  -f/u CBC  -supportive care  2. Other issues:   -care per medicine

## 2022-12-30 NOTE — CONSULT NOTE ADULT - SUBJECTIVE AND OBJECTIVE BOX
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 Per pt's wife, they recently flew up from [place] on Thursday and  has been complaining of headache and green mucous from his nose. Pt was seen at  and put on Amoxicillin. Wife states that he normally uses O2 at night because of sleep apnea, unable to tolerate CPAP mask. Pt denies any abdominal pain, but states he has a large abdominal wall hernia. Pt states he had a fall about a week ago where he strained some muscles in his right arm. Per wife pt is unable to ambulate on his own  In the ED WBC found to be elevated lactate 3.0, RVP + Rhino/Entero Virus. He received cefepime and vancomycin IV.     PMH: as above  PSH: as above  Meds: per reconciliation sheet, noted below  MEDICATIONS  (STANDING):  acetaminophen     Tablet .. 1300 milliGRAM(s) Oral <User Schedule>  atorvastatin 20 milliGRAM(s) Oral at bedtime  cefepime  Injectable.      cefepime  Injectable. 1000 milliGRAM(s) IV Push every 8 hours  dextrose 5%. 1000 milliLiter(s) (50 mL/Hr) IV Continuous <Continuous>  dextrose 50% Injectable 25 Gram(s) IV Push once  escitalopram 10 milliGRAM(s) Oral daily  glucagon  Injectable 1 milliGRAM(s) IntraMuscular once  insulin lispro (ADMELOG) corrective regimen sliding scale   SubCutaneous three times a day before meals  levothyroxine 25 MICROGram(s) Oral daily  melatonin 6 milliGRAM(s) Oral at bedtime  mometasone 220 MICROgram(s) Inhaler 2 Puff(s) Inhalation daily  montelukast 10 milliGRAM(s) Oral at bedtime  pantoprazole    Tablet 40 milliGRAM(s) Oral before breakfast  sodium chloride 0.9%. 1000 milliLiter(s) (75 mL/Hr) IV Continuous <Continuous>  vancomycin  IVPB 1250 milliGRAM(s) IV Intermittent every 12 hours    MEDICATIONS  (PRN):  albuterol    90 MICROgram(s) HFA Inhaler 2 Puff(s) Inhalation every 6 hours PRN for shortness of breath and/or wheezing  benzocaine 15 mG/menthol 3.6 mG Lozenge 1 Lozenge Oral once PRN Sore Throat  dextrose Oral Gel 15 Gram(s) Oral once PRN Blood Glucose LESS THAN 70 milliGRAM(s)/deciliter  oxyCODONE    IR 5 milliGRAM(s) Oral every 8 hours PRN Severe Pain (7 - 10)  temazepam 30 milliGRAM(s) Oral at bedtime PRN Insomnia    Allergies    meropenem (Rash)    Intolerances      Social: no smoking, no alcohol, no illegal drugs; no recent travel, no exposure to TB  FAMILY HISTORY:    no history of premature cardiovascular disease in first degree relatives    ROS: the patient denies fever, no chills, no HA, no seizures, no dizziness, no sore throat, no nasal congestion, no blurry vision, no CP, no palpitations, no SOB, no cough, no abdominal pain, no diarrhea, no N/V, no dysuria, no leg pain, no claudication, no rash, no joint aches, no rectal pain or bleeding, no night sweats  All other systems reviewed and are negative    Vital Signs Last 24 Hrs  T(C): 37.2 (30 Dec 2022 08:23), Max: 37.4 (29 Dec 2022 17:37)  T(F): 99 (30 Dec 2022 08:23), Max: 99.3 (29 Dec 2022 17:37)  HR: 82 (30 Dec 2022 08:50) (70 - 84)  BP: 107/45 (30 Dec 2022 08:23) (98/47 - 121/52)  BP(mean): 68 (29 Dec 2022 17:19) (68 - 68)  RR: 18 (30 Dec 2022 08:23) (16 - 19)  SpO2: 96% (30 Dec 2022 08:50) (94% - 100%)    Parameters below as of 30 Dec 2022 08:23  Patient On (Oxygen Delivery Method): nasal cannula  O2 Flow (L/min): 2    Daily     Daily     PE:    Constitutional:  No acute distress  HEENT: NC/AT, EOMI, PERRLA, conjunctivae clear; ears and nose atraumatic; pharynx benign  Neck: supple; thyroid not palpable  Back: no tenderness  Respiratory: respiratory effort normal; clear to auscultation  Cardiovascular: S1S2 regular, no murmurs  Abdomen: soft, not tender, not distended, positive BS; no liver or spleen organomegaly  Genitourinary: no suprapubic tenderness  Lymphatic: no LN palpable  Musculoskeletal: no muscle tenderness, no joint swelling or tenderness  Extremities: no pedal edema  Neurological/ Psychiatric: AxOx3, judgement and insight normal; moving all extremities  Skin: no rashes; no palpable lesions    Labs: all available labs reviewed                        13.6   10.79 )-----------( 311      ( 29 Dec 2022 10:12 )             41.8         142  |  108  |  17  ----------------------------<  209<H>  4.1   |  27  |  1.15    Ca    8.9      29 Dec 2022 10:12    TPro  7.1  /  Alb  3.0<L>  /  TBili  0.6  /  DBili  x   /  AST  19  /  ALT  26  /  AlkPhos  93       LIVER FUNCTIONS - ( 29 Dec 2022 10:12 )  Alb: 3.0 g/dL / Pro: 7.1 gm/dL / ALK PHOS: 93 U/L / ALT: 26 U/L / AST: 19 U/L / GGT: x           Urinalysis Basic - ( 29 Dec 2022 10:48 )    Color: Yellow / Appearance: Clear / S.025 / pH: x  Gluc: x / Ketone: Trace  / Bili: Negative / Urobili: Negative   Blood: x / Protein: 30 mg/dL / Nitrite: Negative   Leuk Esterase: Negative / RBC: 0-2 /HPF / WBC 0-2 /HPF   Sq Epi: x / Non Sq Epi: Occasional / Bacteria: Occasional    Rhinovirus ( @ 11:25)  Detected      Radiology: all available radiological tests reviewed    < from: Xray Chest 1 View-PORTABLE IMMEDIATE (22 @ 11:33) >  IMPRESSION: No acute finding.    < end of copied text >      Advanced directives addressed: full resuscitation

## 2022-12-31 LAB
ADD ON TEST-SPECIMEN IN LAB: SIGNIFICANT CHANGE UP
ADD ON TEST-SPECIMEN IN LAB: SIGNIFICANT CHANGE UP
ANION GAP SERPL CALC-SCNC: 6 MMOL/L — SIGNIFICANT CHANGE UP (ref 5–17)
APTT BLD: 31.1 SEC — SIGNIFICANT CHANGE UP (ref 27.5–35.5)
BASOPHILS # BLD AUTO: 0.01 K/UL — SIGNIFICANT CHANGE UP (ref 0–0.2)
BASOPHILS NFR BLD AUTO: 0.1 % — SIGNIFICANT CHANGE UP (ref 0–2)
BUN SERPL-MCNC: 16 MG/DL — SIGNIFICANT CHANGE UP (ref 7–23)
CALCIUM SERPL-MCNC: 8.1 MG/DL — LOW (ref 8.5–10.1)
CHLORIDE SERPL-SCNC: 112 MMOL/L — HIGH (ref 96–108)
CO2 SERPL-SCNC: 24 MMOL/L — SIGNIFICANT CHANGE UP (ref 22–31)
CREAT SERPL-MCNC: 0.84 MG/DL — SIGNIFICANT CHANGE UP (ref 0.5–1.3)
EGFR: 92 ML/MIN/1.73M2 — SIGNIFICANT CHANGE UP
EOSINOPHIL # BLD AUTO: 0.75 K/UL — HIGH (ref 0–0.5)
EOSINOPHIL NFR BLD AUTO: 7.9 % — HIGH (ref 0–6)
GLUCOSE BLDC GLUCOMTR-MCNC: 125 MG/DL — HIGH (ref 70–99)
GLUCOSE BLDC GLUCOMTR-MCNC: 93 MG/DL — SIGNIFICANT CHANGE UP (ref 70–99)
GLUCOSE BLDC GLUCOMTR-MCNC: 96 MG/DL — SIGNIFICANT CHANGE UP (ref 70–99)
GLUCOSE SERPL-MCNC: 152 MG/DL — HIGH (ref 70–99)
HCT VFR BLD CALC: 33.6 % — LOW (ref 39–50)
HGB BLD-MCNC: 11 G/DL — LOW (ref 13–17)
IMM GRANULOCYTES NFR BLD AUTO: 0.7 % — SIGNIFICANT CHANGE UP (ref 0–0.9)
INR BLD: 1.77 RATIO — HIGH (ref 0.88–1.16)
LYMPHOCYTES # BLD AUTO: 0.62 K/UL — LOW (ref 1–3.3)
LYMPHOCYTES # BLD AUTO: 6.5 % — LOW (ref 13–44)
MAGNESIUM SERPL-MCNC: 1.9 MG/DL — SIGNIFICANT CHANGE UP (ref 1.6–2.6)
MCHC RBC-ENTMCNC: 32 PG — SIGNIFICANT CHANGE UP (ref 27–34)
MCHC RBC-ENTMCNC: 32.7 GM/DL — SIGNIFICANT CHANGE UP (ref 32–36)
MCV RBC AUTO: 97.7 FL — SIGNIFICANT CHANGE UP (ref 80–100)
MONOCYTES # BLD AUTO: 0.31 K/UL — SIGNIFICANT CHANGE UP (ref 0–0.9)
MONOCYTES NFR BLD AUTO: 3.2 % — SIGNIFICANT CHANGE UP (ref 2–14)
NEUTROPHILS # BLD AUTO: 7.79 K/UL — HIGH (ref 1.8–7.4)
NEUTROPHILS NFR BLD AUTO: 81.6 % — HIGH (ref 43–77)
PHOSPHATE SERPL-MCNC: 1.4 MG/DL — LOW (ref 2.5–4.5)
PLATELET # BLD AUTO: 260 K/UL — SIGNIFICANT CHANGE UP (ref 150–400)
POTASSIUM SERPL-MCNC: 3.6 MMOL/L — SIGNIFICANT CHANGE UP (ref 3.5–5.3)
POTASSIUM SERPL-SCNC: 3.6 MMOL/L — SIGNIFICANT CHANGE UP (ref 3.5–5.3)
PROTHROM AB SERPL-ACNC: 20.7 SEC — HIGH (ref 10.5–13.4)
RBC # BLD: 3.44 M/UL — LOW (ref 4.2–5.8)
RBC # FLD: 15 % — HIGH (ref 10.3–14.5)
SODIUM SERPL-SCNC: 142 MMOL/L — SIGNIFICANT CHANGE UP (ref 135–145)
WBC # BLD: 9.55 K/UL — SIGNIFICANT CHANGE UP (ref 3.8–10.5)
WBC # FLD AUTO: 9.55 K/UL — SIGNIFICANT CHANGE UP (ref 3.8–10.5)

## 2022-12-31 PROCEDURE — 99232 SBSQ HOSP IP/OBS MODERATE 35: CPT

## 2022-12-31 PROCEDURE — 93970 EXTREMITY STUDY: CPT | Mod: 26

## 2022-12-31 RX ORDER — POTASSIUM PHOSPHATE, MONOBASIC POTASSIUM PHOSPHATE, DIBASIC 236; 224 MG/ML; MG/ML
15 INJECTION, SOLUTION INTRAVENOUS ONCE
Refills: 0 | Status: COMPLETED | OUTPATIENT
Start: 2022-12-31 | End: 2022-12-31

## 2022-12-31 RX ORDER — WARFARIN SODIUM 2.5 MG/1
3 TABLET ORAL ONCE
Refills: 0 | Status: COMPLETED | OUTPATIENT
Start: 2022-12-31 | End: 2022-12-31

## 2022-12-31 RX ADMIN — ESCITALOPRAM OXALATE 10 MILLIGRAM(S): 10 TABLET, FILM COATED ORAL at 10:33

## 2022-12-31 RX ADMIN — Medication 650 MILLIGRAM(S): at 14:34

## 2022-12-31 RX ADMIN — AMPICILLIN SODIUM AND SULBACTAM SODIUM 200 GRAM(S): 250; 125 INJECTION, POWDER, FOR SUSPENSION INTRAMUSCULAR; INTRAVENOUS at 17:35

## 2022-12-31 RX ADMIN — Medication 1300 MILLIGRAM(S): at 21:25

## 2022-12-31 RX ADMIN — AMPICILLIN SODIUM AND SULBACTAM SODIUM 200 GRAM(S): 250; 125 INJECTION, POWDER, FOR SUSPENSION INTRAMUSCULAR; INTRAVENOUS at 12:43

## 2022-12-31 RX ADMIN — Medication 25 MICROGRAM(S): at 07:01

## 2022-12-31 RX ADMIN — SODIUM CHLORIDE 75 MILLILITER(S): 9 INJECTION INTRAMUSCULAR; INTRAVENOUS; SUBCUTANEOUS at 08:59

## 2022-12-31 RX ADMIN — PANTOPRAZOLE SODIUM 40 MILLIGRAM(S): 20 TABLET, DELAYED RELEASE ORAL at 07:01

## 2022-12-31 RX ADMIN — POTASSIUM PHOSPHATE, MONOBASIC POTASSIUM PHOSPHATE, DIBASIC 62.5 MILLIMOLE(S): 236; 224 INJECTION, SOLUTION INTRAVENOUS at 18:09

## 2022-12-31 RX ADMIN — AMPICILLIN SODIUM AND SULBACTAM SODIUM 200 GRAM(S): 250; 125 INJECTION, POWDER, FOR SUSPENSION INTRAMUSCULAR; INTRAVENOUS at 06:59

## 2022-12-31 RX ADMIN — WARFARIN SODIUM 3 MILLIGRAM(S): 2.5 TABLET ORAL at 21:24

## 2022-12-31 RX ADMIN — ATORVASTATIN CALCIUM 20 MILLIGRAM(S): 80 TABLET, FILM COATED ORAL at 21:24

## 2022-12-31 RX ADMIN — MOMETASONE FUROATE 2 PUFF(S): 220 INHALANT RESPIRATORY (INHALATION) at 09:46

## 2022-12-31 RX ADMIN — Medication 650 MILLIGRAM(S): at 19:05

## 2022-12-31 RX ADMIN — AMPICILLIN SODIUM AND SULBACTAM SODIUM 200 GRAM(S): 250; 125 INJECTION, POWDER, FOR SUSPENSION INTRAMUSCULAR; INTRAVENOUS at 22:48

## 2022-12-31 RX ADMIN — MONTELUKAST 10 MILLIGRAM(S): 4 TABLET, CHEWABLE ORAL at 21:24

## 2022-12-31 RX ADMIN — Medication 6 MILLIGRAM(S): at 21:24

## 2023-01-01 LAB
ANION GAP SERPL CALC-SCNC: 8 MMOL/L — SIGNIFICANT CHANGE UP (ref 5–17)
BUN SERPL-MCNC: 14 MG/DL — SIGNIFICANT CHANGE UP (ref 7–23)
CALCIUM SERPL-MCNC: 7.9 MG/DL — LOW (ref 8.5–10.1)
CHLORIDE SERPL-SCNC: 112 MMOL/L — HIGH (ref 96–108)
CO2 SERPL-SCNC: 23 MMOL/L — SIGNIFICANT CHANGE UP (ref 22–31)
CREAT SERPL-MCNC: 0.8 MG/DL — SIGNIFICANT CHANGE UP (ref 0.5–1.3)
EGFR: 93 ML/MIN/1.73M2 — SIGNIFICANT CHANGE UP
FOLATE SERPL-MCNC: 5.7 NG/ML — SIGNIFICANT CHANGE UP
GLUCOSE BLDC GLUCOMTR-MCNC: 123 MG/DL — HIGH (ref 70–99)
GLUCOSE SERPL-MCNC: 211 MG/DL — HIGH (ref 70–99)
HCT VFR BLD CALC: 32.1 % — LOW (ref 39–50)
HGB BLD-MCNC: 10.6 G/DL — LOW (ref 13–17)
INR BLD: 2.16 RATIO — HIGH (ref 0.88–1.16)
IRON SATN MFR SERPL: 20 UG/DL — LOW (ref 45–165)
IRON SATN MFR SERPL: 9 % — LOW (ref 16–55)
MAGNESIUM SERPL-MCNC: 1.7 MG/DL — SIGNIFICANT CHANGE UP (ref 1.6–2.6)
PHOSPHATE SERPL-MCNC: 1.4 MG/DL — LOW (ref 2.5–4.5)
POTASSIUM SERPL-MCNC: 3.5 MMOL/L — SIGNIFICANT CHANGE UP (ref 3.5–5.3)
POTASSIUM SERPL-SCNC: 3.5 MMOL/L — SIGNIFICANT CHANGE UP (ref 3.5–5.3)
PROTHROM AB SERPL-ACNC: 25.2 SEC — HIGH (ref 10.5–13.4)
SODIUM SERPL-SCNC: 143 MMOL/L — SIGNIFICANT CHANGE UP (ref 135–145)
TIBC SERPL-MCNC: 229 UG/DL — SIGNIFICANT CHANGE UP (ref 220–430)
UIBC SERPL-MCNC: 209 UG/DL — SIGNIFICANT CHANGE UP (ref 110–370)
VIT B12 SERPL-MCNC: 391 PG/ML — SIGNIFICANT CHANGE UP (ref 232–1245)

## 2023-01-01 PROCEDURE — 99232 SBSQ HOSP IP/OBS MODERATE 35: CPT

## 2023-01-01 RX ORDER — SODIUM,POTASSIUM PHOSPHATES 278-250MG
1 POWDER IN PACKET (EA) ORAL
Refills: 0 | Status: DISCONTINUED | OUTPATIENT
Start: 2023-01-01 | End: 2023-01-02

## 2023-01-01 RX ORDER — FOLIC ACID 0.8 MG
1 TABLET ORAL DAILY
Refills: 0 | Status: DISCONTINUED | OUTPATIENT
Start: 2023-01-01 | End: 2023-01-02

## 2023-01-01 RX ORDER — PREGABALIN 225 MG/1
1000 CAPSULE ORAL DAILY
Refills: 0 | Status: DISCONTINUED | OUTPATIENT
Start: 2023-01-01 | End: 2023-01-02

## 2023-01-01 RX ORDER — POTASSIUM PHOSPHATE, MONOBASIC POTASSIUM PHOSPHATE, DIBASIC 236; 224 MG/ML; MG/ML
30 INJECTION, SOLUTION INTRAVENOUS ONCE
Refills: 0 | Status: COMPLETED | OUTPATIENT
Start: 2023-01-01 | End: 2023-01-01

## 2023-01-01 RX ORDER — WARFARIN SODIUM 2.5 MG/1
3 TABLET ORAL ONCE
Refills: 0 | Status: COMPLETED | OUTPATIENT
Start: 2023-01-01 | End: 2023-01-01

## 2023-01-01 RX ORDER — IRON SUCROSE 20 MG/ML
200 INJECTION, SOLUTION INTRAVENOUS EVERY 24 HOURS
Refills: 0 | Status: COMPLETED | OUTPATIENT
Start: 2023-01-01 | End: 2023-01-02

## 2023-01-01 RX ORDER — PREGABALIN 225 MG/1
1000 CAPSULE ORAL ONCE
Refills: 0 | Status: COMPLETED | OUTPATIENT
Start: 2023-01-01 | End: 2023-01-01

## 2023-01-01 RX ADMIN — Medication 1 MILLIGRAM(S): at 10:55

## 2023-01-01 RX ADMIN — MONTELUKAST 10 MILLIGRAM(S): 4 TABLET, CHEWABLE ORAL at 21:29

## 2023-01-01 RX ADMIN — Medication 1 PACKET(S): at 14:05

## 2023-01-01 RX ADMIN — Medication 1 PACKET(S): at 20:21

## 2023-01-01 RX ADMIN — MOMETASONE FUROATE 2 PUFF(S): 220 INHALANT RESPIRATORY (INHALATION) at 09:24

## 2023-01-01 RX ADMIN — IRON SUCROSE 200 MILLIGRAM(S): 20 INJECTION, SOLUTION INTRAVENOUS at 10:55

## 2023-01-01 RX ADMIN — AMPICILLIN SODIUM AND SULBACTAM SODIUM 200 GRAM(S): 250; 125 INJECTION, POWDER, FOR SUSPENSION INTRAMUSCULAR; INTRAVENOUS at 21:55

## 2023-01-01 RX ADMIN — WARFARIN SODIUM 3 MILLIGRAM(S): 2.5 TABLET ORAL at 21:29

## 2023-01-01 RX ADMIN — POTASSIUM PHOSPHATE, MONOBASIC POTASSIUM PHOSPHATE, DIBASIC 83.33 MILLIMOLE(S): 236; 224 INJECTION, SOLUTION INTRAVENOUS at 14:05

## 2023-01-01 RX ADMIN — Medication 1300 MILLIGRAM(S): at 21:32

## 2023-01-01 RX ADMIN — ATORVASTATIN CALCIUM 20 MILLIGRAM(S): 80 TABLET, FILM COATED ORAL at 21:29

## 2023-01-01 RX ADMIN — AMPICILLIN SODIUM AND SULBACTAM SODIUM 200 GRAM(S): 250; 125 INJECTION, POWDER, FOR SUSPENSION INTRAMUSCULAR; INTRAVENOUS at 12:40

## 2023-01-01 RX ADMIN — ESCITALOPRAM OXALATE 10 MILLIGRAM(S): 10 TABLET, FILM COATED ORAL at 10:55

## 2023-01-01 RX ADMIN — PANTOPRAZOLE SODIUM 40 MILLIGRAM(S): 20 TABLET, DELAYED RELEASE ORAL at 05:38

## 2023-01-01 RX ADMIN — AMPICILLIN SODIUM AND SULBACTAM SODIUM 200 GRAM(S): 250; 125 INJECTION, POWDER, FOR SUSPENSION INTRAMUSCULAR; INTRAVENOUS at 05:38

## 2023-01-01 RX ADMIN — Medication 6 MILLIGRAM(S): at 21:28

## 2023-01-01 RX ADMIN — PREGABALIN 1000 MICROGRAM(S): 225 CAPSULE ORAL at 10:55

## 2023-01-01 RX ADMIN — Medication 25 MICROGRAM(S): at 05:38

## 2023-01-01 NOTE — PROGRESS NOTE ADULT - ASSESSMENT
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 Per pt's wife, they recently flew up from [place] on Thursday and  has been complaining of headache and green mucous from his nose. Pt was seen at  and put on Amoxicillin. Wife states that he normally uses O2 at night because of sleep apnea, unable to tolerate CPAP mask. Pt denies any abdominal pain, but states he has a large abdominal wall hernia. Pt states he had a fall about a week ago where he strained some muscles in his right arm. Per wife pt is unable to ambulate on his own  In the ED WBC found to be elevated lactate 3.0, RVP + Rhino/Entero Virus. He received cefepime and vancomycin IV.     1. Low grade fever. Acute right otitis media and acute b/l maxillary and ethmoid sinusitis. URI with rhinovirus. Possible sepsis. Prostate Ca. Prior AML s/p BMT.  -BC x 2 noted  -CT sinuses noted  -on unasyn 3 gm IV q6h # 3  -tolerating abx well so far; no side effects noted  -f/u cultures  -may change abx to augmentin 875 mg PO q12h for 10 more days  -monitor temps  -f/u CBC  -supportive care  2. Other issues:   -care per medicine  
73 y/o male with PMHx of Prostate CA s/p prostatectomy 5/10, DVT/PE on daily War[facility]in [facility], DM2, HLD, HTN, obesity, osteoarthritis, COPD/IPF, PTSD/insomnia, AML s/p stem cell transplant c/b GVHD, presents to the ED c/o weakness, fevers, right ear infection and sinus infection x 3 days. Per pt's wife, they recently flew up from [place]  and  has been complaining of headache and green mucous from his nose. Pt was seen at  and put on Amoxicillin. Pt has a history of Leukemia (AML) s/p stem cell transplant per wife, pt now has jsigj-phslti-yued disease. Wife states that he normally uses O2 at night because of sleep apnea unable to tolerate CPAP mask. . Pt denies any abd pain, but states he has a large abdominal wall hernia. Pt states he has an artificial urinary sphincter for incontinence after prostatectomy that can't be catheterized. Pt states he had a fall about a week ago where he strained some muscles in his right arm. Per wife pt is unable to ambulate on his own  In the ED WBC found to be elevated, Lactate 3.0, RVP + Rhino/Entero Virus, received sepsis fluids.    #entero/rhino virus infection, acute R. otitis media, acute sinusitis   #fever/chills, leukocytosis   No sepsis POA , SIRS criteria not met   WBC 10.8 -> 8.9  CT sinuses as above  Continue IV fluids, repeat lactate, 2.9 -> 1.3  Follow blood cultures, urine cultures, trend fever and WBC  Symptomatic supportive care. Bronchodilators PRN, Tylenol PRN, continue inhaled steroids and montelukast for COPD  PT consult  Was on amoxicillin outpatient, s/p vanco/cefepime -> changed to IV unasyn per ID  ID consult appreciated  BP soft, IVF bolus PRN    #DM  - Hold home DM meds, HISS, heck A1C, consistent carb diet    #Hx VTE on coumadin  #subtherapeutic INR  - Patient missed 1 dose warfarin 3mg PTA  - INR 1.97 -> 1.7  - C/w warfarin 3mg qHS  - Trend INR    #DOMINICK on nocturnal O2  - does not tolerate CPAP. continue nocturnal 2L NC     #Prostate CA s/p prostatectomy 5/10, HLD, HTN, obesity, osteoarthritis, COPD/IPF, PTSD/insomnia, AML s/p stem cell transplant c/b GVHD  - C/w home meds, outpatient f/u for further management     #DVT ppx   - on warfarin    #Diet   -consistent carb     #Code  - Full     #Dispo  - Pending further clinical improvement 
MEDICATIONS  (STANDING):  acetaminophen     Tablet .. 1300 milliGRAM(s) Oral <User Schedule>  ampicillin/sulbactam  IVPB 3 Gram(s) IV Intermittent every 6 hours  atorvastatin 20 milliGRAM(s) Oral at bedtime  cyanocobalamin 1000 MICROGram(s) Oral daily  escitalopram 10 milliGRAM(s) Oral daily  folic acid 1 milliGRAM(s) Oral daily  iron sucrose Injectable 200 milliGRAM(s) IV Push every 24 hours  levothyroxine 25 MICROGram(s) Oral daily  melatonin 6 milliGRAM(s) Oral at bedtime  mometasone 220 MICROgram(s) Inhaler 2 Puff(s) Inhalation daily  montelukast 10 milliGRAM(s) Oral at bedtime  pantoprazole    Tablet 40 milliGRAM(s) Oral before breakfast  potassium phosphate / sodium phosphate Powder (PHOS-NaK) 1 Packet(s) Oral three times a day with meals  warfarin 3 milliGRAM(s) Oral once    MEDICATIONS  (PRN):  acetaminophen     Tablet .. 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 Per pt's wife, they recently flew up from [place]  and  has been complaining of headache and green mucous from his nose. Pt was seen at  and put on Amoxicillin. Pt has a history of Leukemia (AML) s/p stem cell transplant per wife, pt now has nesei-[id number]-wwfq disease. Wife states that he normally uses O2 at night because of sleep apnea unable to tolerate CPAP mask. . Pt denies any abd pain, but states he has a large abdominal wall hernia. Pt states he has an artificial urinary sphincter for incontinence after prostatectomy that can't be catheterized. Pt states he had a fall about a week ago where he strained some muscles in his right arm. Per wife pt is unable to ambulate on his own  In the ED WBC found to be elevated, Lactate 3.0, RVP + Rhino/Entero Virus, received sepsis fluids.    #entero/rhino virus infection, acute R. otitis media, acute sinusitis   #fever/chills, leukocytosis (<12)   No sepsis POA , SIRS criteria not met   WBC 10.8 -> 8.9  CT sinuses as above  Continue IV fluids, repeat lactate, 2.9 -> 1.3  Blood culture/Urine Culture NGTD  Symptomatic supportive care. Bronchodilators PRN, Tylenol PRN, continue inhaled steroids and montelukast for COPD  PT consult  Was on amoxicillin outpatient, s/p vanco/cefepime -> changed to IV unasyn per ID  ID consult appreciated  BP soft, IVF bolus PRN    #DM  - Hold home DM meds, HISS, heck A1C, consistent carb diet    #Hx VTE on coumadin  #subtherapeutic INR  - Patient missed 1 dose warfarin 3mg PTA  - INR stable  - C/w warfarin 3mg qHS  - Trend INR    Anemia.  -hx of iron deficiency  -no overt signs of bleeding  -Slight drop. Possible diluation  -Iron low->venofer ordered x 2    #DOMINICK on nocturnal O2  - does not tolerate CPAP. 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 Cleared for discharge on PO augmentin.  Morning labs resulted after 12pm with Ph of 1.4 after completing K Phos IV day prior. K phosph and oral phos ordered. by the time K Phos completed, too late to discharge. Repeat labs in am with plans for discharge tomorrow on Po antibiotics. 
  MEDICATIONS  (STANDING):  acetaminophen     Tablet .. 1300 milliGRAM(s) Oral <User Schedule>  ampicillin/sulbactam  IVPB 3 Gram(s) IV Intermittent every 6 hours  atorvastatin 20 milliGRAM(s) Oral at bedtime  dextrose 5%. 1000 milliLiter(s) (50 mL/Hr) IV Continuous <Continuous>  dextrose 50% Injectable 25 Gram(s) IV Push once  escitalopram 10 milliGRAM(s) Oral daily  glucagon  Injectable 1 milliGRAM(s) IntraMuscular once  insulin lispro (ADMELOG) corrective regimen sliding scale   SubCutaneous three times a day before meals  levothyroxine 25 MICROGram(s) Oral daily  melatonin 6 milliGRAM(s) Oral at bedtime  mometasone 220 MICROgram(s) Inhaler 2 Puff(s) Inhalation daily  montelukast 10 milliGRAM(s) Oral at bedtime  pantoprazole    Tablet 40 milliGRAM(s) Oral before breakfast  potassium phosphate IVPB 15 milliMole(s) IV Intermittent once  warfarin 3 milliGRAM(s) Oral once    MEDICATIONS  (PRN):  acetaminophen     Tablet .. 650 milliGRAM(s) Oral every 6 hours PRN Temp greater or equal to 38C (100.4F), Mild Pain (1 - 3)  albuterol    90 MICROgram(s) HFA Inhaler 2 Puff(s) Inhalation every 6 hours PRN for shortness of breath and/or wheezing  benzocaine 15 mG/menthol 3.6 mG Lozenge 1 Lozenge Oral once PRN Sore Throat  dextrose Oral Gel 15 Gram(s) Oral once PRN Blood Glucose LESS THAN 70 milliGRAM(s)/deciliter  oxyCODONE    IR 5 milliGRAM(s) Oral every 8 hours PRN Severe Pain (7 - 10)  temazepam 30 milliGRAM(s) Oral at bedtime PRN Insomnia    73 y/o male with PMHx of Prostate CA s/p prostatectomy 5/10, DVT/PE on daily Warfarin [facility]mg [facility], DM2, HLD, HTN, obesity, osteoarthritis, COPD/IPF, PTSD/insomnia, AML s/p stem cell transplant c/b GVHD, presents to the ED c/o weakness, fevers, right ear infection and sinus infection x 3 days. Per pt's wife, they recently flew up from [place]  and  has been complaining of headache and green mucous from his nose. Pt was seen at  and put on Amoxicillin. Pt has a history of Leukemia (AML) s/p stem cell transplant per wife, pt now has vrien-vibtwm-uohf disease. Wife states that he normally uses O2 at night because of sleep apnea unable to tolerate CPAP mask. . Pt denies any abd pain, but states he has a large abdominal wall hernia. Pt states he has an artificial urinary sphincter for incontinence after prostatectomy that can't be catheterized. Pt states he had a fall about a week ago where he strained some muscles in his right arm. Per wife pt is unable to ambulate on his own  In the ED WBC found to be elevated, Lactate 3.0, RVP + Rhino/Entero Virus, received sepsis fluids.    #entero/rhino virus infection, acute R. otitis media, acute sinusitis   #fever/chills, leukocytosis (<12)   No sepsis POA , SIRS criteria not met   WBC 10.8 -> 8.9  CT sinuses as above  Continue IV fluids, repeat lactate, 2.9 -> 1.3  Blood culture/Urine Culture NGTD  Symptomatic supportive care. Bronchodilators PRN, Tylenol PRN, continue inhaled steroids and montelukast for COPD  PT consult  Was on amoxicillin outpatient, s/p vanco/cefepime -> changed to IV unasyn per ID  ID consult appreciated  BP soft, IVF bolus PRN    #DM  - Hold home DM meds, HISS, heck A1C, consistent carb diet    #Hx VTE on coumadin  #subtherapeutic INR  - Patient missed 1 dose warfarin 3mg PTA  - INR 1.97 -> 1.7  - C/w warfarin 3mg qHS  - Trend INR    #DOMINICK on nocturnal O2  - does not tolerate CPAP. continue nocturnal 2L NC     #Prostate CA s/p prostatectomy 5/10, HLD, HTN, obesity, osteoarthritis, COPD/IPF, PTSD/insomnia, AML s/p stem cell transplant c/b GVHD  - C/w home meds, outpatient f/u for further management     #DVT ppx   - on warfarin    #Diet   -consistent carb     #Code  - Full     #Dispo  - Pending further clinical improvement 
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 Per pt's wife, they recently flew up from [place] on Thursday and  has been complaining of headache and green mucous from his nose. Pt was seen at  and put on Amoxicillin. Wife states that he normally uses O2 at night because of sleep apnea, unable to tolerate CPAP mask. Pt denies any abdominal pain, but states he has a large abdominal wall hernia. Pt states he had a fall about a week ago where he strained some muscles in his right arm. Per wife pt is unable to ambulate on his own  In the ED WBC found to be elevated lactate 3.0, RVP + Rhino/Entero Virus. He received cefepime and vancomycin IV.     1. Low grade fever. Acute right otitis media and acute b/l maxillary and ethmoid sinusitis. URI with rhinovirus. Possible sepsis. Prostate Ca. Prior AML s/p BMT.  -BC x 2 noted  -CT sinuses noted  -on unasyn 3 gm IV q6h # 2  -tolerating abx well so far; no side effects noted  -f/u cultures  -continue abx coverage   -monitor temps  -f/u CBC  -supportive care  2. Other issues:   -care per medicine

## 2023-01-01 NOTE — PROGRESS NOTE ADULT - SUBJECTIVE AND OBJECTIVE BOX
Date of service: 22 @ 10:54    Lying in bed in NAD  Alert and verbal  Has low grade fever  Has right frontal sinus tenderness    ROS: no fever or chills; denies dizziness, no HA, no SOB or cough, no abdominal pain, no diarrhea or constipation; no dysuria, no legs pain, no rashes    MEDICATIONS  (STANDING):  acetaminophen     Tablet .. 1300 milliGRAM(s) Oral <User Schedule>  ampicillin/sulbactam  IVPB 3 Gram(s) IV Intermittent every 6 hours  atorvastatin 20 milliGRAM(s) Oral at bedtime  dextrose 5%. 1000 milliLiter(s) (50 mL/Hr) IV Continuous <Continuous>  dextrose 50% Injectable 25 Gram(s) IV Push once  escitalopram 10 milliGRAM(s) Oral daily  glucagon  Injectable 1 milliGRAM(s) IntraMuscular once  insulin lispro (ADMELOG) corrective regimen sliding scale   SubCutaneous three times a day before meals  levothyroxine 25 MICROGram(s) Oral daily  melatonin 6 milliGRAM(s) Oral at bedtime  mometasone 220 MICROgram(s) Inhaler 2 Puff(s) Inhalation daily  montelukast 10 milliGRAM(s) Oral at bedtime  pantoprazole    Tablet 40 milliGRAM(s) Oral before breakfast  sodium chloride 0.9%. 1000 milliLiter(s) (75 mL/Hr) IV Continuous <Continuous>    Vital Signs Last 24 Hrs  T(C): 37 (31 Dec 2022 08:45), Max: 38.1 (30 Dec 2022 16:01)  T(F): 98.6 (31 Dec 2022 08:45), Max: 100.6 (30 Dec 2022 16:01)  HR: 82 (31 Dec 2022 08:45) (71 - 92)  BP: 142/59 (31 Dec 2022 08:45) (100/44 - 142/59)  BP(mean): --  RR: 18 (31 Dec 2022 08:45) (18 - 18)  SpO2: 91% (31 Dec 2022 08:45) (91% - 94%)    Parameters below as of 31 Dec 2022 08:45  Patient On (Oxygen Delivery Method): room air     Physical exam:    Constitutional:  No acute distress  HEENT: NC/AT, EOMI, PERRLA, conjunctivae clear; ears and nose atraumatic  Neck: supple; thyroid not palpable  Back: no tenderness  Respiratory: respiratory effort normal; clear to auscultation  Cardiovascular: S1S2 regular, no murmurs  Abdomen: soft, not tender, not distended, positive BS  Genitourinary: no suprapubic tenderness  Lymphatic: no LN palpable  Musculoskeletal: no muscle tenderness, no joint swelling or tenderness  Extremities: no pedal edema  Neurological/ Psychiatric: AxOx3, judgement and insight normal; moving all extremities  Skin: no rashes; no palpable lesions    Labs: reviewed                        11.5   8.90  )-----------( 257      ( 30 Dec 2022 11:51 )             36.0                         13.6   10.79 )-----------( 311      ( 29 Dec 2022 10:12 )             41.8     12-    142  |  108  |  17  ----------------------------<  209<H>  4.1   |  27  |  1.15    Ca    8.9      29 Dec 2022 10:12    TPro  7.1  /  Alb  3.0<L>  /  TBili  0.6  /  DBili  x   /  AST  19  /  ALT  26  /  AlkPhos  93  12-     LIVER FUNCTIONS - ( 29 Dec 2022 10:12 )  Alb: 3.0 g/dL / Pro: 7.1 gm/dL / ALK PHOS: 93 U/L / ALT: 26 U/L / AST: 19 U/L / GGT: x           Urinalysis Basic - ( 29 Dec 2022 10:48 )    Color: Yellow / Appearance: Clear / S.025 / pH: x  Gluc: x / Ketone: Trace  / Bili: Negative / Urobili: Negative   Blood: x / Protein: 30 mg/dL / Nitrite: Negative   Leuk Esterase: Negative / RBC: 0-2 /HPF / WBC 0-2 /HPF   Sq Epi: x / Non Sq Epi: Occasional / Bacteria: Occasional    Rhinovirus ( @ 11:25)  Detected    Culture - Blood (collected 29 Dec 2022 11:38)  Source: .Blood None  Preliminary Report (30 Dec 2022 16:01):    No growth to date.    Culture - Urine (collected 29 Dec 2022 10:47)  Source: Clean Catch Clean Catch (Midstream)  Final Report (30 Dec 2022 20:57):    <10,000 CFU/mL Normal Urogenital [place]    Culture - Blood (collected 29 Dec 2022 10:12)  Source: .Blood None  Preliminary Report (30 Dec 2022 16:01):    No growth to date.    Radiology: all available radiological tests reviewed    < from: Xray Chest 1 View-PORTABLE IMMEDIATE (22 @ 11:33) >  IMPRESSION: No acute finding.  < end of copied text >    < from: CT Sinuses No Cont (22 @ 12:55) >  Moderate mucosal thickening in the BILATERAL maxillary and ethmoid   sinuses. Mild mucosal thickening in the inferior frontal and anterior   sphenoid sinuses. No abnormal paranasal sinus fluid collection is found.    No osseous erosion or expansion is present. There is obstruction of the   BILATERAL ethmoid infundibula, frontal sinus outflow tracts and   sphenoethmoidal recesses with mucosal thickening.  < end of copied text >      Advanced directives addressed: full resuscitation
Chief Complaint: weakness, fevers, congestion      Subjective/Interval events:     12/31  - No acute events overnight, VSS; Tmax 100.6 with associated chills.   - CT sinuses c/f sinusitis, on IV unasyn per ID  - INR 1.97 -> 1.7 , WBC normalized   -LE edema. Baseline has some but might be more than baseline. INR subthereutic. LE doppler ordered.     ROS:   Patient denies any current chest pain, SOB, cough, f/c/n/v/d, abd pain, LE edema, myalgias, dysuria, HA, dizziness  All other review of systems is negative unless indicated above    Physical Exam:  Vital Signs Last 24 Hrs  T(C): 37.2 (30 Dec 2022 08:23), Max: 37.4 (29 Dec 2022 17:37)  T(F): 99 (30 Dec 2022 08:23), Max: 99.3 (29 Dec 2022 17:37)  HR: 82 (30 Dec 2022 08:50) (70 - 84)  BP: 107/45 (30 Dec 2022 08:23) (98/47 - 121/52)  BP(mean): 68 (29 Dec 2022 17:19) (68 - 68)  RR: 18 (30 Dec 2022 08:23) (16 - 19)  SpO2: 96% (30 Dec 2022 08:50) (94% - 100%)    Constitutional: NAD, awake and alert  HEENT: PERRA, EOMI, moist MM, +TTP maxillary sinus b/l   Respiratory: Breath sounds are clear bilaterally, No wheezing, rales or rhonchi; Normal respiratory effort.   Cardiovascular: S1 and S2, RRR, no murmurs, gallops or rubs  Gastrointestinal: +BS, soft, non-tender, non-distended, no CVA tenderness  Extremities: Trace LE edema; Chronic Venous stasis changes. , +DP pulses b/l  Neurological: A&O x 3, no focal deficits  Musculoskeletal: 5/5 strength b/l upper and lower extremities  Skin: Normal, skin warm and dry    Labs:                          11.0   9.55  )-----------( 260      ( 31 Dec 2022 12:18 )             33.6     12-31    142  |  112<H>  |  16  ----------------------------<  152<H>  3.6   |  24  |  0.84    Ca    8.1<L>      31 Dec 2022 12:18  Phos  1.4     12-31  Mg     1.9     12-31      SARS-CoV-2: NotDetec (29 Dec 2022 11:25)    CAPILLARY BLOOD GLUCOSE      POCT Blood Glucose.: 125 mg/dL (31 Dec 2022 12:41)  POCT Blood Glucose.: 93 mg/dL (31 Dec 2022 07:59)  POCT Blood Glucose.: 120 mg/dL (30 Dec 2022 21:56)      Culture - Blood (collected 29 Dec 2022 11:38)  Source: .Blood None  Preliminary Report (30 Dec 2022 16:01):    No growth to date.    Culture - Urine (collected 29 Dec 2022 10:47)  Source: Clean Catch Clean Catch (Midstream)  Final Report (30 Dec 2022 20:57):    <10,000 CFU/mL Normal Urogenital [place]    Culture - Blood (collected 29 Dec 2022 10:12)  Source: .Blood None  Preliminary Report (30 Dec 2022 16:01):    No growth to date.        Micro:  RVP +Entero/rhino virus     BCx / UCx -- NGTD    Radiology:  < from: CT Sinuses No Cont (12.30.22 @ 12:55) >  IMPRESSION:    Moderate mucosal thickening in the BILATERAL maxillary and ethmoid   sinuses. Mild mucosal thickening in the inferior frontal and anterior   sphenoid sinuses. No abnormal paranasal sinus fluid collection is found.    No osseous erosion or expansion is present. There is obstruction of the   BILATERAL ethmoid infundibula, frontal sinus outflow tracts and   sphenoethmoidal recesses with mucosal thickening.    < end of copied text >        
Chief Complaint: weakness, fevers, congestion      Interval events:   - No acute events overnight, VSS, pt afebrile   - CT sinuses c/f sinusitis, on IV unasyn per ID  - INR 1.97 -> 1.7 , WBC normalized     ROS:   Patient denies any current chest pain, SOB, cough, f/c/n/v/d, abd pain, LE edema, myalgias, dysuria, HA, dizziness  All other review of systems is negative unless indicated above    Physical Exam:  Vital Signs Last 24 Hrs  T(C): 37.2 (30 Dec 2022 08:23), Max: 37.4 (29 Dec 2022 17:37)  T(F): 99 (30 Dec 2022 08:23), Max: 99.3 (29 Dec 2022 17:37)  HR: 82 (30 Dec 2022 08:50) (70 - 84)  BP: 107/45 (30 Dec 2022 08:23) (98/47 - 121/52)  BP(mean): 68 (29 Dec 2022 17:19) (68 - 68)  RR: 18 (30 Dec 2022 08:23) (16 - 19)  SpO2: 96% (30 Dec 2022 08:50) (94% - 100%)    Constitutional: NAD, awake and alert  HEENT: PERRLA, EOMI, dry MM, +TTP maxillary sinus b/l   Respiratory: Breath sounds are clear bilaterally, No wheezing, rales or rhonchi  Cardiovascular: S1 and S2, RRR, no murmurs, gallops or rubs  Gastrointestinal: +BS, soft, non-tender, non-distended, no CVA tenderness  Extremities: No peripheral edema, +DP pulses b/l  Neurological: A&O x 3, no focal deficits  Musculoskeletal: 5/5 strength b/l upper and lower extremities  Skin: Normal, skin warm and dry    Labs:  WBC 8.90  Hemoglobin 11.5  Hemoatocrit 36.0  Platelet count 257    PT/INR - ( 30 Dec 2022 11:51 )   PT: 19.8 sec;   INR: 1.70 ratio    PTT - ( 29 Dec 2022 10:12 )  PTT:34.0 sec    Micro:  RVP +Entero/rhino virus     BCx / UCx -- pending     Radiology:  < from: CT Sinuses No Cont (12.30.22 @ 12:55) >  IMPRESSION:    Moderate mucosal thickening in the BILATERAL maxillary and ethmoid   sinuses. Mild mucosal thickening in the inferior frontal and anterior   sphenoid sinuses. No abnormal paranasal sinus fluid collection is found.    No osseous erosion or expansion is present. There is obstruction of the   BILATERAL ethmoid infundibula, frontal sinus outflow tracts and   sphenoethmoidal recesses with mucosal thickening.    < end of copied text >      Medications:  MEDICATIONS  (STANDING):  acetaminophen     Tablet .. 1300 milliGRAM(s) Oral <User Schedule>  ampicillin/sulbactam  IVPB 3 Gram(s) IV Intermittent every 6 hours  atorvastatin 20 milliGRAM(s) Oral at bedtime  dextrose 5%. 1000 milliLiter(s) (50 mL/Hr) IV Continuous <Continuous>  dextrose 50% Injectable 25 Gram(s) IV Push once  escitalopram 10 milliGRAM(s) Oral daily  glucagon  Injectable 1 milliGRAM(s) IntraMuscular once  insulin lispro (ADMELOG) corrective regimen sliding scale   SubCutaneous three times a day before meals  levothyroxine 25 MICROGram(s) Oral daily  melatonin 6 milliGRAM(s) Oral at bedtime  mometasone 220 MICROgram(s) Inhaler 2 Puff(s) Inhalation daily  montelukast 10 milliGRAM(s) Oral at bedtime  pantoprazole    Tablet 40 milliGRAM(s) Oral before breakfast  sodium chloride 0.9%. 1000 milliLiter(s) (75 mL/Hr) IV Continuous <Continuous>  warfarin 3 milliGRAM(s) Oral at bedtime    MEDICATIONS  (PRN):  albuterol    90 MICROgram(s) HFA Inhaler 2 Puff(s) Inhalation every 6 hours PRN for shortness of breath and/or wheezing  benzocaine 15 mG/menthol 3.6 mG Lozenge 1 Lozenge Oral once PRN Sore Throat  dextrose Oral Gel 15 Gram(s) Oral once PRN Blood Glucose LESS THAN 70 milliGRAM(s)/deciliter  oxyCODONE    IR 5 milliGRAM(s) Oral every 8 hours PRN Severe Pain (7 - 10)  temazepam 30 milliGRAM(s) Oral at bedtime PRN Insomnia    
Chief Complaint: weakness, fevers, congestion      Subjective/Interval events:     12/31  - No acute events overnight, VSS; Tmax 100.6 with associated chills.   - CT sinuses c/f sinusitis, on IV unasyn per ID  - INR 1.97 -> 1.7 , WBC normalized   -LE edema. Baseline has some but might be more than baseline. INR subthereutic. LE doppler ordered.     1/1:  Afebrile. Feeling well  Discussed with ID. Cleared for discharge on PO augmentin.  Morning labs resulted after 12pm with Ph of 1.4 after completing K Phos IV day prior. K phosph and oral phos ordered. by the time K Phos completed, too late to discharge. Repeat labs in am with plans for discharge tomorrow on Po antibiotics.     ROS:   Patient denies any current chest pain, SOB, cough, f/c/n/v/d, abd pain, LE edema, myalgias, dysuria, HA, dizziness  All other review of systems is negative unless indicated above    Physical Exam:  Vital Signs Last 24 Hrs  T(C): 37 (01-01-23 @ 09:31), Max: 37.2 (12-31-22 @ 22:00)  HR: 84 (01-01-23 @ 09:31) (74 - 93)  BP: 121/46 (01-01-23 @ 09:31) (121/46 - 155/49)  RR: 18 (01-01-23 @ 09:31) (18 - 19)  SpO2: 92% (01-01-23 @ 09:31) (92% - 96%)    Constitutional: NAD, awake and alert  HEENT: PERRA, EOMI, moist MM, +TTP maxillary sinus b/l   Respiratory: Breath sounds are clear bilaterally, No wheezing, rales or rhonchi; Normal respiratory effort.   Cardiovascular: S1 and S2, RRR, no murmurs, gallops or rubs  Gastrointestinal: +BS, soft, non-tender, non-distended, no CVA tenderness  Extremities: Trace LE edema; Chronic Venous stasis changes. , +DP pulses b/l  Neurological: A&O x 3, no focal deficits  Musculoskeletal: 5/5 strength b/l upper and lower extremities                        10.6   x     )-----------( x        ( 01 Jan 2023 11:06 )             32.1     01-01    143  |  112<H>  |  14  ----------------------------<  211<H>  3.5   |  23  |  0.80    Ca    7.9<L>      01 Jan 2023 11:06  Phos  1.4     01-01  Mg     1.7     01-01      SARS-CoV-2: NotDete (29 Dec 2022 11:25)    CAPILLARY BLOOD GLUCOSE      POCT Blood Glucose.: 123 mg/dL (01 Jan 2023 08:02)  POCT Blood Glucose.: 96 mg/dL (31 Dec 2022 19:02)    Skin: Normal, skin warm and dry    Labs:                          11.0   9.55  )-----------( 260      ( 31 Dec 2022 12:18 )             33.6     12-31    142  |  112<H>  |  16  ----------------------------<  152<H>  3.6   |  24  |  0.84    Ca    8.1<L>      31 Dec 2022 12:18  Phos  1.4     12-31  Mg     1.9     12-31      SARS-CoV-2: NotDete (29 Dec 2022 11:25)    CAPILLARY BLOOD GLUCOSE      POCT Blood Glucose.: 125 mg/dL (31 Dec 2022 12:41)  POCT Blood Glucose.: 93 mg/dL (31 Dec 2022 07:59)  POCT Blood Glucose.: 120 mg/dL (30 Dec 2022 21:56)      Culture - Blood (collected 29 Dec 2022 11:38)  Source: .Blood None  Preliminary Report (30 Dec 2022 16:01):    No growth to date.    Culture - Urine (collected 29 Dec 2022 10:47)  Source: Clean Catch Clean Catch (Midstream)  Final Report (30 Dec 2022 20:57):    <10,000 CFU/mL Normal Urogenital [place]    Culture - Blood (collected 29 Dec 2022 10:12)  Source: .Blood None  Preliminary Report (30 Dec 2022 16:01):    No growth to date.        Micro:  RVP +Entero/rhino virus     BCx / UCx -- NGTD    Radiology:  < from: CT Sinuses No Cont (12.30.22 @ 12:55) >  IMPRESSION:    Moderate mucosal thickening in the BILATERAL maxillary and ethmoid   sinuses. Mild mucosal thickening in the inferior frontal and anterior   sphenoid sinuses. No abnormal paranasal sinus fluid collection is found.    No osseous erosion or expansion is present. There is obstruction of the   BILATERAL ethmoid infundibula, frontal sinus outflow tracts and   sphenoethmoidal recesses with mucosal thickening.    < end of copied text >        
Date of service: 23 @ 11:21    Lying in bed in NAD  Has low grade fever  has nasal congestion    ROS: denies dizziness, no HA, no SOB or cough, no abdominal pain, no diarrhea or constipation; no dysuria, no legs pain, no rashes    MEDICATIONS  (STANDING):  acetaminophen     Tablet .. 1300 milliGRAM(s) Oral <User Schedule>  ampicillin/sulbactam  IVPB 3 Gram(s) IV Intermittent every 6 hours  atorvastatin 20 milliGRAM(s) Oral at bedtime  cyanocobalamin 1000 MICROGram(s) Oral daily  escitalopram 10 milliGRAM(s) Oral daily  folic acid 1 milliGRAM(s) Oral daily  iron sucrose Injectable 200 milliGRAM(s) IV Push every 24 hours  levothyroxine 25 MICROGram(s) Oral daily  melatonin 6 milliGRAM(s) Oral at bedtime  mometasone 220 MICROgram(s) Inhaler 2 Puff(s) Inhalation daily  montelukast 10 milliGRAM(s) Oral at bedtime  pantoprazole    Tablet 40 milliGRAM(s) Oral before breakfast    Vital Signs Last 24 Hrs  T(C): 37 (2023 09:31), Max: 38.1 (31 Dec 2022 14:28)  T(F): 98.6 (2023 09:31), Max: 100.6 (31 Dec 2022 14:28)  HR: 84 (2023 09:31) (74 - 93)  BP: 121/46 (2023 09:31) (121/46 - 155/49)  BP(mean): --  RR: 18 (2023 09:31) (18 - 19)  SpO2: 92% (2023 09:31) (91% - 96%)    Parameters below as of 2023 09:31  Patient On (Oxygen Delivery Method): room air     Physical exam:    Constitutional:  No acute distress  HEENT: NC/AT, EOMI, PERRLA, conjunctivae clear; ears and nose atraumatic  Neck: supple; thyroid not palpable  Back: no tenderness  Respiratory: respiratory effort normal; clear to auscultation  Cardiovascular: S1S2 regular, no murmurs  Abdomen: soft, not tender, not distended, positive BS  Genitourinary: no suprapubic tenderness  Lymphatic: no LN palpable  Musculoskeletal: no muscle tenderness, no joint swelling or tenderness  Extremities: no pedal edema  Neurological/ Psychiatric: AxOx3, judgement and insight normal; moving all extremities  Skin: no rashes; no palpable lesions    Labs: reviewed                        11.0   9.55  )-----------( 260      ( 31 Dec 2022 12:18 )             33.6         142  |  112<H>  |  16  ----------------------------<  152<H>  3.6   |  24  |  0.84    Ca    8.1<L>      31 Dec 2022 12:18  Phos  1.4       Mg     1.9                             11.5   8.90  )-----------( 257      ( 30 Dec 2022 11:51 )             36.0                         13.6   10.79 )-----------( 311      ( 29 Dec 2022 10:12 )             41.8         142  |  108  |  17  ----------------------------<  209<H>  4.1   |  27  |  1.15    Ca    8.9      29 Dec 2022 10:12    TPro  7.1  /  Alb  3.0<L>  /  TBili  0.6  /  DBili  x   /  AST  19  /  ALT  26  /  AlkPhos  93       LIVER FUNCTIONS - ( 29 Dec 2022 10:12 )  Alb: 3.0 g/dL / Pro: 7.1 gm/dL / ALK PHOS: 93 U/L / ALT: 26 U/L / AST: 19 U/L / GGT: x           Urinalysis Basic - ( 29 Dec 2022 10:48 )    Color: Yellow / Appearance: Clear / S.025 / pH: x  Gluc: x / Ketone: Trace  / Bili: Negative / Urobili: Negative   Blood: x / Protein: 30 mg/dL / Nitrite: Negative   Leuk Esterase: Negative / RBC: 0-2 /HPF / WBC 0-2 /HPF   Sq Epi: x / Non Sq Epi: Occasional / Bacteria: Occasional    Rhinovirus ( @ 11:25)  Detected    Culture - Blood (collected 29 Dec 2022 11:38)  Source: .Blood None  Preliminary Report (30 Dec 2022 16:01):    No growth to date.    Culture - Urine (collected 29 Dec 2022 10:47)  Source: Clean Catch Clean Catch (Midstream)  Final Report (30 Dec 2022 20:57):    <10,000 CFU/mL Normal Urogenital [place]    Culture - Blood (collected 29 Dec 2022 10:12)  Source: .Blood None  Preliminary Report (30 Dec 2022 16:01):    No growth to date.    Radiology: all available radiological tests reviewed    < from: Xray Chest 1 View-PORTABLE IMMEDIATE (22 @ 11:33) >  IMPRESSION: No acute finding.  < end of copied text >    < from: CT Sinuses No Cont (22 @ 12:55) >  Moderate mucosal thickening in the BILATERAL maxillary and ethmoid   sinuses. Mild mucosal thickening in the inferior frontal and anterior   sphenoid sinuses. No abnormal paranasal sinus fluid collection is found.    No osseous erosion or expansion is present. There is obstruction of the   BILATERAL ethmoid infundibula, frontal sinus outflow tracts and   sphenoethmoidal recesses with mucosal thickening.  < end of copied text >      Advanced directives addressed: full resuscitation

## 2023-01-02 ENCOUNTER — TRANSCRIPTION ENCOUNTER (OUTPATIENT)
Age: 73
End: 2023-01-02

## 2023-01-02 VITALS
HEART RATE: 64 BPM | SYSTOLIC BLOOD PRESSURE: 118 MMHG | RESPIRATION RATE: 18 BRPM | DIASTOLIC BLOOD PRESSURE: 52 MMHG | TEMPERATURE: 98 F | OXYGEN SATURATION: 93 %

## 2023-01-02 LAB
HCT VFR BLD CALC: 31.5 % — LOW (ref 39–50)
HGB BLD-MCNC: 10 G/DL — LOW (ref 13–17)
INR BLD: 2.29 RATIO — HIGH (ref 0.88–1.16)
PHOSPHATE SERPL-MCNC: 3.2 MG/DL — SIGNIFICANT CHANGE UP (ref 2.5–4.5)
PROTHROM AB SERPL-ACNC: 26.8 SEC — HIGH (ref 10.5–13.4)

## 2023-01-02 PROCEDURE — 99239 HOSP IP/OBS DSCHRG MGMT >30: CPT

## 2023-01-02 RX ORDER — DEXLANSOPRAZOLE 30 MG/1
1 CAPSULE, DELAYED RELEASE ORAL
Qty: 0 | Refills: 0 | DISCHARGE

## 2023-01-02 RX ORDER — PANTOPRAZOLE SODIUM 20 MG/1
1 TABLET, DELAYED RELEASE ORAL
Qty: 30 | Refills: 0
Start: 2023-01-02 | End: 2023-01-31

## 2023-01-02 RX ORDER — PREGABALIN 225 MG/1
1 CAPSULE ORAL
Qty: 30 | Refills: 0
Start: 2023-01-02 | End: 2023-01-31

## 2023-01-02 RX ORDER — FOLIC ACID 0.8 MG
1 TABLET ORAL
Qty: 30 | Refills: 0
Start: 2023-01-02 | End: 2023-01-31

## 2023-01-02 RX ADMIN — Medication 1 PACKET(S): at 08:24

## 2023-01-02 RX ADMIN — PANTOPRAZOLE SODIUM 40 MILLIGRAM(S): 20 TABLET, DELAYED RELEASE ORAL at 06:07

## 2023-01-02 RX ADMIN — Medication 1 MILLIGRAM(S): at 12:06

## 2023-01-02 RX ADMIN — MOMETASONE FUROATE 2 PUFF(S): 220 INHALANT RESPIRATORY (INHALATION) at 08:01

## 2023-01-02 RX ADMIN — PREGABALIN 1000 MICROGRAM(S): 225 CAPSULE ORAL at 12:06

## 2023-01-02 RX ADMIN — AMPICILLIN SODIUM AND SULBACTAM SODIUM 200 GRAM(S): 250; 125 INJECTION, POWDER, FOR SUSPENSION INTRAMUSCULAR; INTRAVENOUS at 12:06

## 2023-01-02 RX ADMIN — AMPICILLIN SODIUM AND SULBACTAM SODIUM 200 GRAM(S): 250; 125 INJECTION, POWDER, FOR SUSPENSION INTRAMUSCULAR; INTRAVENOUS at 01:35

## 2023-01-02 RX ADMIN — ESCITALOPRAM OXALATE 10 MILLIGRAM(S): 10 TABLET, FILM COATED ORAL at 12:07

## 2023-01-02 RX ADMIN — AMPICILLIN SODIUM AND SULBACTAM SODIUM 200 GRAM(S): 250; 125 INJECTION, POWDER, FOR SUSPENSION INTRAMUSCULAR; INTRAVENOUS at 06:07

## 2023-01-02 RX ADMIN — Medication 1 PACKET(S): at 12:07

## 2023-01-02 RX ADMIN — IRON SUCROSE 200 MILLIGRAM(S): 20 INJECTION, SOLUTION INTRAVENOUS at 13:04

## 2023-01-02 RX ADMIN — Medication 25 MICROGRAM(S): at 06:07

## 2023-01-02 NOTE — DISCHARGE NOTE PROVIDER - HOSPITAL COURSE
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 Per pt's wife, they recently flew up from [place] on Thursday and  has been complaining of headache and green mucous from his nose. Pt was seen at  and put on Amoxicillin. Pt has a history of Leukemia (AML) s/p stem cell transplant per wife, with subsequent mysmh-zbyvea-xjas disease. Wife states that he normally uses O2 at night because of sleep apnea, unable to tolerate CPAP mask. Pt denies any abdominal pain, but states he has a large abdominal wall hernia. Pt states he has an artificial urinary sphincter for incontinence after prostatectomy and can't be catheterized. Pt states he had a fall about a week ago where he strained some muscles in his right arm, s/p bilateral shoulder replacement surgeries. Per wife pt is unable to ambulate on his own  In the ED WBC found to be elevated, Lactate 3.0, RVP + Rhino/Entero Virus, received sepsis fluids."    #entero/rhino virus infection, acute R. otitis media, acute sinusitis   #fever/chills, leukocytosis (<12)   No sepsis POA , SIRS criteria not met   WBC 10.8 -> 8.9  CT sinuses as above  Continue IV fluids, repeat lactate, 2.9 -> 1.3  Blood culture/Urine Culture NGTD  Symptomatic supportive care. Bronchodilators PRN, Tylenol PRN, continue inhaled steroids and montelukast for COPD  PT consult  Was on amoxicillin outpatient, s/p vanco/cefepime -> changed to IV unasyn per ID-> Cleared for discharge on PO antibiotics  ID consult appreciated  BP soft, IVF bolus PRN    #DM  - Hold home DM meds, HISS, heck A1C, consistent carb diet    #Hx VTE on coumadin  #subtherapeutic INR  - Patient missed 1 dose warfarin 3mg PTA  - INR stable  - C/w warfarin 3mg qHS  - Trend INR    Anemia.  -hx of iron deficiency  -no overt signs of bleeding  -Slight drop. Possible diluation  -Iron low->venofer ordered x 2  -B12 and Folate low normal. Replace    #DOMINICK on nocturnal O2  - does not tolerate CPAP. 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 Cleared for discharge on PO augmentin.  Ph stable. INR therapeutic. Afebrile and hemodynamically stable. Discharge home in stable condition to complete course of oral antibiotics  PHYSICAL EXAM:    T(C): 36.5 (01-02-23 @ 07:25), Max: 36.9 (01-01-23 @ 15:32)  HR: 68 (01-02-23 @ 08:02) (64 - 81)  BP: 118/52 (01-02-23 @ 07:25) (118/52 - 134/60)  RR: 18 (01-02-23 @ 07:25) (17 - 18)  SpO2: 93% (01-02-23 @ 07:25) (92% - 97%)    General: AAOx3; NAD  ENT: Moist Mucous Membranes; No Injury  CVS: RRR, S1&S2, No murmur, No edema  Respiratory: Lungs CTA B/L; Normal Respiratory Effort  Abdomen/GI: Soft, non-tender, non-distended, no guarding, no rebound, normal bowel sounds  Extremities: No cyanosis, No clubbing, No edema  MSK: No CVA tenderness, Normal ROM, No injury  Neuro: AAOx3, CNII-XII grossly intact, non-focal  Skin: Clean, Dry and Intact  Discharge Management: 38 minutes  Date of Discharge/Service: 1/2/2022

## 2023-01-02 NOTE — DISCHARGE NOTE NURSING/CASE MANAGEMENT/SOCIAL WORK - PATIENT PORTAL LINK FT
You can access the FollowMyHealth Patient Portal offered by [facility] by registering at the following website: [email or web address]. By joining [employer]’s FollowMyHealth portal, you will also be able to view your health information using other applications (apps) compatible with our system.

## 2023-01-02 NOTE — DISCHARGE NOTE NURSING/CASE MANAGEMENT/SOCIAL WORK - [email or web address]_GEN_ALL_CORE
For information on Fall & Injury Prevention, visit: [email or web address]  [email or web address] OR  https://www.cdc.gov/steadi/patient.html

## 2023-01-02 NOTE — DISCHARGE NOTE PROVIDER - NSDCCPCAREPLAN_GEN_ALL_CORE_FT
PRINCIPAL DISCHARGE DIAGNOSIS  Diagnosis: Bacterial otitis media  Assessment and Plan of Treatment: Complete course of antibiotics. Follow up outpatient with your primary medical doctor      SECONDARY DISCHARGE DIAGNOSES  Diagnosis: Acute weakness  Assessment and Plan of Treatment:     Diagnosis: Iron deficiency anemia  Assessment and Plan of Treatment: Your iron level was low. You were given IV Iron (Venofer) x 2 dose. Follow up with your hematologist at [place]. Also your Vitamin B12 and Folate levels where on the low normal side. You were given injection of B12. Continue oral Vitamin B12 and Folic Acid Supplementation and follow up with your hematologist.    Diagnosis: Venous thromboembolism (VTE)  Assessment and Plan of Treatment: On coumadin for reported history of blood clot. Continue coumadin as directed. Your INR on the day of discharge resulted at 2.61. Follow up with your hematologist.    Diagnosis: Diabetes  Assessment and Plan of Treatment: Your A1c on this admission 6.0%. Continue home medications as directed. Continue Carbohydrated controlled/diabetic diet. Follow up with your primary medical doctor.     PRINCIPAL DISCHARGE DIAGNOSIS  Diagnosis: Bacterial otitis media  Assessment and Plan of Treatment: Complete course of antibiotics. Follow up outpatient with your primary medical doctor      SECONDARY DISCHARGE DIAGNOSES  Diagnosis: Acute weakness  Assessment and Plan of Treatment:     Diagnosis: Iron deficiency anemia  Assessment and Plan of Treatment: Your iron level was low. You were given IV Iron (Venofer) x 2 dose. Follow up with your hematologist at [place]. Also your Vitamin B12 and Folate levels where on the low normal side. You were given injection of B12. Continue oral Vitamin B12 and Folic Acid Supplementation and follow up with your hematologist.    Diagnosis: Venous thromboembolism (VTE)  Assessment and Plan of Treatment: On coumadin for reported history of blood clot. Continue coumadin as directed. Your INR on the day of discharge resulted at 2.61. Follow up with your hematologist.    Diagnosis: Diabetes  Assessment and Plan of Treatment: Your A1c on this admission 6.0%. Continue home medications as directed. Continue Carbohydrated controlled/diabetic diet. Follow up with your primary medical doctor.    Diagnosis: Chronic GERD  Assessment and Plan of Treatment: You reported improved control of reflux symptoms with protonix compared to your other home medication. As such discontinue your prior home medicatoin and a prescription for protonix (pantoprazole) has been provided. Follow up with your primary medical doctor.

## 2023-01-02 NOTE — DISCHARGE NOTE PROVIDER - CARE PROVIDER_API CALL
Hematologist,   Phone: (   )    -  Fax: (   )    -  Established Patient  Follow Up Time: 2 weeks    Primary Medical Doctor,   Phone: (   )    -  Fax: (   )    -  Established Patient  Follow Up Time: 1 week

## 2023-01-02 NOTE — DISCHARGE NOTE PROVIDER - PROVIDER TOKENS
FREE:[LAST:[Hematologist],PHONE:[(   )    -],FAX:[(   )    -],FOLLOWUP:[2 weeks],ESTABLISHEDPATIENT:[T]],FREE:[LAST:[Primary Medical Doctor],PHONE:[(   )    -],FAX:[(   )    -],FOLLOWUP:[1 week],ESTABLISHEDPATIENT:[T]]

## 2023-01-02 NOTE — DISCHARGE NOTE PROVIDER - NSDCMRMEDTOKEN_GEN_ALL_CORE_FT
acetaminophen 650 mg oral tablet: 2 tab(s) orally once a day (at bedtime)  Albuterol (Eqv-Proventil HFA) 90 mcg/inh inhalation aerosol: 2 puff(s) inhaled every 6 hours, As Needed - for shortness of breath and/or wheezing  amoxicillin-clavulanate 875 mg-125 mg oral tablet: 1 tab(s) orally 2 times a day   atorvastatin 20 mg oral tablet: 1 tab(s) orally once a day  B-12 1000 mcg oral tablet: 1 tab(s) orally once a day   Breztri Aerosphere inhalation aerosol: 1 puff(s) inhaled 2 times a day  cyanocobalamin 1000 mcg/mL injectable solution: 1000 microgram(s) injectable once a month  escitalopram 10 mg oral tablet: 1 tab(s) orally once a day (in the evening)  folic acid 1 mg oral tablet: 1 tab(s) orally once a day  levothyroxine 25 mcg (0.025 mg) oral tablet: 1 tab(s) orally once a day  lidocaine-prilocaine 2.5%-2.5% topical cream: Apply topically to affected area as needed  losartan 25 mg oral tablet: 0.5 tab(s) orally 2 times a day for BP &gt; 110  Melatonin 3 mg oral tablet: 2 tab(s) orally once a day (at bedtime)  montelukast 10 mg oral tablet: 1 tab(s) orally once a day (in the evening)  oxyCODONE 5 mg oral tablet: 1 tab(s) orally every 8 hours, As Needed  pantoprazole 40 mg oral delayed release tablet: 1 tab(s) orally once a day (before a meal)  temazepam 15 mg oral capsule: 2 cap(s) orally once a day (at bedtime)  Trulicity Pen 1.5 mg/0.5 mL subcutaneous solution: 1.5 milligram(s) subcutaneously once a week  warfarin 3 mg oral tablet: 1 tab(s) orally once a day

## 2023-01-03 LAB
CULTURE RESULTS: SIGNIFICANT CHANGE UP
CULTURE RESULTS: SIGNIFICANT CHANGE UP
SPECIMEN SOURCE: SIGNIFICANT CHANGE UP
SPECIMEN SOURCE: SIGNIFICANT CHANGE UP

## 2023-01-06 DIAGNOSIS — H66.91 OTITIS MEDIA, UNSPECIFIED, RIGHT EAR: ICD-10-CM

## 2023-01-06 DIAGNOSIS — Z79.84 LONG TERM (CURRENT) USE OF ORAL HYPOGLYCEMIC DRUGS: ICD-10-CM

## 2023-01-06 DIAGNOSIS — D50.9 IRON DEFICIENCY ANEMIA, UNSPECIFIED: ICD-10-CM

## 2023-01-06 DIAGNOSIS — I10 ESSENTIAL (PRIMARY) HYPERTENSION: ICD-10-CM

## 2023-01-06 DIAGNOSIS — J01.90 ACUTE SINUSITIS, UNSPECIFIED: ICD-10-CM

## 2023-01-06 DIAGNOSIS — Z79.85 LONG-TERM (CURRENT) USE OF INJECTABLE NON-INSULIN ANTIDIABETIC DRUGS: ICD-10-CM

## 2023-01-06 DIAGNOSIS — Z79.01 LONG TERM (CURRENT) USE OF ANTICOAGULANTS: ICD-10-CM

## 2023-01-06 DIAGNOSIS — Z86.718 PERSONAL HISTORY OF OTHER VENOUS THROMBOSIS AND EMBOLISM: ICD-10-CM

## 2023-01-06 DIAGNOSIS — E11.9 TYPE 2 DIABETES MELLITUS WITHOUT COMPLICATIONS: ICD-10-CM

## 2023-01-06 DIAGNOSIS — Z85.46 PERSONAL HISTORY OF MALIGNANT NEOPLASM OF PROSTATE: ICD-10-CM

## 2023-01-06 DIAGNOSIS — K21.9 GASTRO-ESOPHAGEAL REFLUX DISEASE WITHOUT ESOPHAGITIS: ICD-10-CM

## 2023-01-06 DIAGNOSIS — G47.33 OBSTRUCTIVE SLEEP APNEA (ADULT) (PEDIATRIC): ICD-10-CM

## 2023-01-06 DIAGNOSIS — J44.9 CHRONIC OBSTRUCTIVE PULMONARY DISEASE, UNSPECIFIED: ICD-10-CM

## 2023-01-06 DIAGNOSIS — M19.90 UNSPECIFIED OSTEOARTHRITIS, UNSPECIFIED SITE: ICD-10-CM

## 2023-01-06 DIAGNOSIS — E78.5 HYPERLIPIDEMIA, UNSPECIFIED: ICD-10-CM

## 2023-01-06 DIAGNOSIS — B97.19 OTHER ENTEROVIRUS AS THE CAUSE OF DISEASES CLASSIFIED ELSEWHERE: ICD-10-CM

## 2023-01-06 DIAGNOSIS — C92.01 ACUTE MYELOBLASTIC LEUKEMIA, IN REMISSION: ICD-10-CM

## 2023-01-06 DIAGNOSIS — Z90.79 ACQUIRED ABSENCE OF OTHER GENITAL ORGAN(S): ICD-10-CM

## 2023-01-06 DIAGNOSIS — Z86.711 PERSONAL HISTORY OF PULMONARY EMBOLISM: ICD-10-CM

## 2025-04-29 NOTE — ED ADULT NURSE NOTE - NSICDXPASTSURGICALHX_GEN_ALL_CORE_FT
Detail Level: Generalized PAST SURGICAL HISTORY:  History of Prostatectomy 5/10    Varicose Vein stripping Right LE 1987